# Patient Record
Sex: FEMALE | Race: WHITE | NOT HISPANIC OR LATINO | Employment: FULL TIME | URBAN - METROPOLITAN AREA
[De-identification: names, ages, dates, MRNs, and addresses within clinical notes are randomized per-mention and may not be internally consistent; named-entity substitution may affect disease eponyms.]

---

## 2022-07-15 ENCOUNTER — OFFICE VISIT (OUTPATIENT)
Dept: FAMILY MEDICINE CLINIC | Facility: CLINIC | Age: 57
End: 2022-07-15

## 2022-07-15 ENCOUNTER — PATIENT ROUNDING (BHMG ONLY) (OUTPATIENT)
Dept: FAMILY MEDICINE CLINIC | Facility: CLINIC | Age: 57
End: 2022-07-15

## 2022-07-15 VITALS
OXYGEN SATURATION: 98 % | BODY MASS INDEX: 26.43 KG/M2 | SYSTOLIC BLOOD PRESSURE: 118 MMHG | TEMPERATURE: 96.9 F | DIASTOLIC BLOOD PRESSURE: 77 MMHG | HEART RATE: 85 BPM | RESPIRATION RATE: 16 BRPM | WEIGHT: 143.6 LBS | HEIGHT: 62 IN

## 2022-07-15 DIAGNOSIS — Z13.220 SCREENING FOR CHOLESTEROL LEVEL: ICD-10-CM

## 2022-07-15 DIAGNOSIS — G89.29 CHRONIC RIGHT SHOULDER PAIN: ICD-10-CM

## 2022-07-15 DIAGNOSIS — M25.511 CHRONIC RIGHT SHOULDER PAIN: ICD-10-CM

## 2022-07-15 DIAGNOSIS — Z12.11 COLON CANCER SCREENING: ICD-10-CM

## 2022-07-15 DIAGNOSIS — J44.9 CHRONIC OBSTRUCTIVE PULMONARY DISEASE, UNSPECIFIED COPD TYPE: Primary | ICD-10-CM

## 2022-07-15 DIAGNOSIS — Z13.1 SCREENING FOR DIABETES MELLITUS: ICD-10-CM

## 2022-07-15 PROCEDURE — 99204 OFFICE O/P NEW MOD 45 MIN: CPT | Performed by: FAMILY MEDICINE

## 2022-07-15 RX ORDER — MELOXICAM 7.5 MG/1
7.5 TABLET ORAL 2 TIMES DAILY
COMMUNITY

## 2022-07-15 NOTE — PROGRESS NOTES
Subjective   Yolanda Burk is a 56 y.o. female.   Chief Complaint   Patient presents with   • Establish Care   • COPD   • Shoulder Pain       History of Present Illness   Presents to the office today as a new patient.  She saw Dr. Camacho in [place] for 30 years.  She retired recently.  Past medical history updated as below.  No current complaints.  She is in her usual state.  Please see below for additional discussion regarding chronic problems and preventive care.        Patient Active Problem List    Diagnosis Date Noted   • Chronic right shoulder pain 07/15/2022     Note Last Updated: 7/15/2022     Chronic bursitis     • Chronic obstructive lung disease (HCC) 2016     Note Last Updated: 7/15/2022     Had PFTs - at [place] - 2016    Was on Trelegy - got expensive.    Does OK with Anoro. Has cough that she didn't have on Trelegy             Past Surgical History:   Procedure Laterality Date   •  SECTION       Current Outpatient Medications on File Prior to Visit   Medication Sig   • meloxicam (MOBIC) 7.5 MG tablet Take 7.5 mg by mouth 2 (Two) Times a Day.   • umeclidinium-vilanterol (Anoro Ellipta) 62.5-25 MCG/INH aerosol powder  inhaler Inhale 1 puff Daily.     No current facility-administered medications on file prior to visit.     No Known Allergies  Social History     Socioeconomic History   • Marital status:    Tobacco Use   • Smoking status: Current Every Day Smoker     Packs/day: 1.00     Years: 40.00     Pack years: 40.00     Types: Cigarettes     Start date:    • Smokeless tobacco: Never Used   • Tobacco comment: she is ready to quit but also in not ready   Vaping Use   • Vaping Use: Never used   Substance and Sexual Activity   • Alcohol use: Not Currently     Comment: rarely   • Drug use: Never   • Sexual activity: Yes     Partners: Male     Family History   Problem Relation Age of Onset   • No Known Problems Mother    • Tuberculosis Father    • Lung disease Father    •  "Hypertension Sister    • Hypertension Brother        Review of Systems    Objective   /77 (BP Location: Left arm, Patient Position: Sitting, Cuff Size: Adult)   Pulse 85   Temp 96.9 °F (36.1 °C) (Infrared)   Resp 16   Ht 157.5 cm (62\")   Wt 65.1 kg (143 lb 9.6 oz)   LMP  (LMP Unknown)   SpO2 98%   Breastfeeding No   BMI 26.26 kg/m²   Physical Exam  Constitutional:       General: She is not in acute distress.     Appearance: She is well-developed.      Comments: Wearing a face mask     HENT:      Head: Normocephalic and atraumatic.   Eyes:      Conjunctiva/sclera: Conjunctivae normal.   Cardiovascular:      Rate and Rhythm: Normal rate and regular rhythm.      Heart sounds: Normal heart sounds. No murmur heard.  Pulmonary:      Effort: Accessory muscle usage and prolonged expiration present. No respiratory distress.      Breath sounds: Decreased breath sounds present. No wheezing, rhonchi or rales.      Comments: No wheezing even on forced expiration.  Musculoskeletal:         General: Normal range of motion.      Cervical back: Normal range of motion.   Skin:     General: Skin is warm and dry.      Findings: No rash.   Neurological:      Mental Status: She is alert and oriented to person, place, and time.   Psychiatric:         Behavior: Behavior normal.         Assessment & Plan   Diagnoses and all orders for this visit:    1. Chronic obstructive pulmonary disease, unspecified COPD type (HCC) (Primary)  -     CBC & Differential    2. Chronic right shoulder pain    3. Colon cancer screening  -     Cologuard - Stool, Per Rectum; Future    4. Screening for cholesterol level  -     Lipid Panel    5. Screening for diabetes mellitus  -     Comprehensive Metabolic Panel    .  Patient new patient to the practice here transferring has her previous doctor of 30 years retired.  Currently in her chronic stable state and doing well.  Recommend continuing meloxicam and Anoro as prescribed.  Regarding preventive " care she has never had a colon cancer screening.  She agrees to Cologuard, which I think is a fine option for her.  She will be due for a mammogram in December 2022 or early 23.  She is not sure when her last Pap smear was but thinks it was over 3 years ago. (Review of previous PCP records indicates last Pap I can find was July 2017-it was normal.)  We will look into her old records and see if we can find information about that.  We will do labs today to screen her for diabetes, high cholesterol and anemia.  We will follow-up with her twice a year to keep an eye on her pulmonary status.  She has had a pneumococcal vaccine and she is vaccinated against COVID-19.  We will also discuss with her at the next visit possible lung cancer screening CT of the chest as she does not believe she has ever had one.    Call with any problems or concerns before next visit       Return in about 6 months (around 1/15/2023).      Much of this report is an electronic transcription of spoken language to printed text using Dragon dictation software.  As such, the subtleties and finesse of spoken language may permit erroneous, or at times, nonsensical words or phrases to be inadvertently transcribed; thus changes may be made at a later date to rectify these errors.     Rosalind Dutton MD7/16/202209:21 EDT  This note has been electronically signed

## 2022-07-15 NOTE — PROGRESS NOTES
July 15, 2022    Hello, may I speak with Yolanda Burk?    My name is Nyasia Sanford     I am  with [facility]  [address]  [place] [address].    Before we get started may I verify your date of birth? 1965    I am calling to officially welcome you to our practice and ask about your recent visit. Is this a good time to talk? yes    Tell me about your visit with us. What things went well?  It went well.  I was so nervous about having a new provider.  I was with my last one for 30 years, they retired.  Everyone is so nice and I won't be nervous next time.       We're always looking for ways to make our patients' experiences even better. Do you have recommendations on ways we may improve?  no    Overall were you satisfied with your first visit to our practice? yes       I appreciate you taking the time to speak with me today. Is there anything else I can do for you? no      Thank you, and have a great day.

## 2022-07-16 LAB
ALBUMIN SERPL-MCNC: 4.5 G/DL (ref 3.8–4.9)
ALBUMIN/GLOB SERPL: 1.9 {RATIO} (ref 1.2–2.2)
ALP SERPL-CCNC: 99 IU/L (ref 44–121)
ALT SERPL-CCNC: 10 IU/L (ref 0–32)
AST SERPL-CCNC: 17 IU/L (ref 0–40)
BASOPHILS # BLD AUTO: 0.1 X10E3/UL (ref 0–0.2)
BASOPHILS NFR BLD AUTO: 1 %
BILIRUB SERPL-MCNC: <0.2 MG/DL (ref 0–1.2)
BUN SERPL-MCNC: 18 MG/DL (ref 6–24)
BUN/CREAT SERPL: 20 (ref 9–23)
CALCIUM SERPL-MCNC: 9.8 MG/DL (ref 8.7–10.2)
CHLORIDE SERPL-SCNC: 106 MMOL/L (ref 96–106)
CHOLEST SERPL-MCNC: 212 MG/DL (ref 100–199)
CO2 SERPL-SCNC: 24 MMOL/L (ref 20–29)
CREAT SERPL-MCNC: 0.88 MG/DL (ref 0.57–1)
EGFRCR SERPLBLD CKD-EPI 2021: 78 ML/MIN/1.73
EOSINOPHIL # BLD AUTO: 0.2 X10E3/UL (ref 0–0.4)
EOSINOPHIL NFR BLD AUTO: 2 %
ERYTHROCYTE [DISTWIDTH] IN BLOOD BY AUTOMATED COUNT: 12.4 % (ref 11.7–15.4)
GLOBULIN SER CALC-MCNC: 2.4 G/DL (ref 1.5–4.5)
GLUCOSE SERPL-MCNC: 91 MG/DL (ref 65–99)
HCT VFR BLD AUTO: 41.5 % (ref 34–46.6)
HDLC SERPL-MCNC: 67 MG/DL
HGB BLD-MCNC: 14.1 G/DL (ref 11.1–15.9)
IMM GRANULOCYTES # BLD AUTO: 0 X10E3/UL (ref 0–0.1)
IMM GRANULOCYTES NFR BLD AUTO: 0 %
LDLC SERPL CALC-MCNC: 127 MG/DL (ref 0–99)
LYMPHOCYTES # BLD AUTO: 3.6 X10E3/UL (ref 0.7–3.1)
LYMPHOCYTES NFR BLD AUTO: 45 %
MCH RBC QN AUTO: 31.1 PG (ref 26.6–33)
MCHC RBC AUTO-ENTMCNC: 34 G/DL (ref 31.5–35.7)
MCV RBC AUTO: 92 FL (ref 79–97)
MONOCYTES # BLD AUTO: 0.7 X10E3/UL (ref 0.1–0.9)
MONOCYTES NFR BLD AUTO: 8 %
NEUTROPHILS # BLD AUTO: 3.5 X10E3/UL (ref 1.4–7)
NEUTROPHILS NFR BLD AUTO: 44 %
PLATELET # BLD AUTO: 341 X10E3/UL (ref 150–450)
POTASSIUM SERPL-SCNC: 4.4 MMOL/L (ref 3.5–5.2)
PROT SERPL-MCNC: 6.9 G/DL (ref 6–8.5)
RBC # BLD AUTO: 4.53 X10E6/UL (ref 3.77–5.28)
SODIUM SERPL-SCNC: 145 MMOL/L (ref 134–144)
TRIGL SERPL-MCNC: 102 MG/DL (ref 0–149)
VLDLC SERPL CALC-MCNC: 18 MG/DL (ref 5–40)
WBC # BLD AUTO: 8 X10E3/UL (ref 3.4–10.8)

## 2022-11-11 ENCOUNTER — TELEPHONE (OUTPATIENT)
Dept: FAMILY MEDICINE CLINIC | Facility: CLINIC | Age: 57
End: 2022-11-11

## 2022-11-11 DIAGNOSIS — J44.9 CHRONIC OBSTRUCTIVE PULMONARY DISEASE, UNSPECIFIED COPD TYPE: Primary | ICD-10-CM

## 2022-11-11 RX ORDER — FLUTICASONE FUROATE, UMECLIDINIUM BROMIDE AND VILANTEROL TRIFENATATE 100; 62.5; 25 UG/1; UG/1; UG/1
1 POWDER RESPIRATORY (INHALATION)
Qty: 1 EACH | Refills: 5 | Status: SHIPPED | OUTPATIENT
Start: 2022-11-11

## 2022-11-11 NOTE — TELEPHONE ENCOUNTER
Please let Yolanda know that I have sent in a prescription for the Trelegy.  Just to be clear, it will replace the Anoro she had been on.  Thanks

## 2022-11-11 NOTE — TELEPHONE ENCOUNTER
Caller: Yolanda Burk    Relationship: Self    Best call back number: [phone]     What medication are you requesting: TRELEGY ELLIPTA    What are your current symptoms: COPD    How long have you been experiencing symptoms: 7 YEARS    Have you had these symptoms before:    [x] Yes  [] No    Have you been treated for these symptoms before:   [x] Yes  [] No    If a prescription is needed, what is your preferred pharmacy and phone number: [facility] [id number] - [place], [place] - [address] [phone] [facility] [phone]

## 2023-01-03 ENCOUNTER — OFFICE VISIT (OUTPATIENT)
Dept: FAMILY MEDICINE CLINIC | Facility: CLINIC | Age: 58
End: 2023-01-03
Payer: COMMERCIAL

## 2023-01-03 VITALS
HEART RATE: 102 BPM | WEIGHT: 143.6 LBS | HEIGHT: 62 IN | RESPIRATION RATE: 16 BRPM | OXYGEN SATURATION: 99 % | BODY MASS INDEX: 26.43 KG/M2 | SYSTOLIC BLOOD PRESSURE: 130 MMHG | TEMPERATURE: 97.8 F | DIASTOLIC BLOOD PRESSURE: 80 MMHG

## 2023-01-03 DIAGNOSIS — J06.9 UPPER RESPIRATORY TRACT INFECTION, UNSPECIFIED TYPE: ICD-10-CM

## 2023-01-03 DIAGNOSIS — R05.1 ACUTE COUGH: ICD-10-CM

## 2023-01-03 DIAGNOSIS — J02.9 SORE THROAT: Primary | ICD-10-CM

## 2023-01-03 LAB
EXPIRATION DATE: NORMAL
FLUAV AG UPPER RESP QL IA.RAPID: NOT DETECTED
FLUBV AG UPPER RESP QL IA.RAPID: NOT DETECTED
INTERNAL CONTROL: NORMAL
Lab: NORMAL
SARS-COV-2 AG UPPER RESP QL IA.RAPID: NOT DETECTED

## 2023-01-03 PROCEDURE — 87428 SARSCOV & INF VIR A&B AG IA: CPT | Performed by: FAMILY MEDICINE

## 2023-01-03 PROCEDURE — 99214 OFFICE O/P EST MOD 30 MIN: CPT | Performed by: FAMILY MEDICINE

## 2023-01-03 RX ORDER — LEVOFLOXACIN 500 MG/1
500 TABLET, FILM COATED ORAL DAILY
Qty: 7 TABLET | Refills: 0 | Status: SHIPPED | OUTPATIENT
Start: 2023-01-03 | End: 2023-01-16

## 2023-01-03 RX ORDER — PREDNISONE 20 MG/1
TABLET ORAL
Qty: 19 TABLET | Refills: 0 | Status: SHIPPED | OUTPATIENT
Start: 2023-01-03 | End: 2023-01-16

## 2023-01-03 NOTE — PROGRESS NOTES
Subjective   Yolanda Burk is a 57 y.o. female.   Chief Complaint   Patient presents with   • Sore Throat   • Cough       History of Present Illness   Presents to the office today as a same-day add-on for what schedule says is sore throat, cough.  She reports the symptoms onset about 5 days ago.  2 days ago she had a temperature of 100.1 degrees.  That responded to Tylenol.  Symptoms have included sore throat, occasional chills.  Deep breath provokes a cough, her ears feel stopped up.  She is still able to eat and drink.  She gets a little shortness of breath with exertion.      Patient Active Problem List    Diagnosis Date Noted   • Chronic right shoulder pain 07/15/2022     Note Last Updated: 7/15/2022     Chronic bursitis     • Chronic obstructive lung disease (HCC) 2016     Note Last Updated: 2022     Had PFTs - at [place] - 2016.  Diagnosis was severe obstructive defect-reversible.  Alpha-1 antitrypsin testing was negative.  Chest x-ray around 16 or 17 had changes consistent with COPD.    Was on Trelegy - got expensive.    Does OK with Anoro. Has cough that she didn't have on Trelegy             Past Surgical History:   Procedure Laterality Date   •  SECTION       Current Outpatient Medications on File Prior to Visit   Medication Sig   • Fluticasone-Umeclidin-Vilant (Trelegy Ellipta) 100-62.5-25 MCG/ACT inhaler Inhale 1 puff Daily.   • meloxicam (MOBIC) 7.5 MG tablet Take 7.5 mg by mouth 2 (Two) Times a Day.     No current facility-administered medications on file prior to visit.     No Known Allergies  Social History     Socioeconomic History   • Marital status:    Tobacco Use   • Smoking status: Every Day     Packs/day: 1.00     Years: 40.00     Pack years: 40.00     Types: Cigarettes     Start date:      Passive exposure: Current   • Smokeless tobacco: Never   • Tobacco comments:     she is ready to quit but also in not ready   Vaping Use   • Vaping Use: Never used    Substance and Sexual Activity   • Alcohol use: Not Currently     Comment: rarely   • Drug use: Never   • Sexual activity: Yes     Partners: Male     Family History   Problem Relation Age of Onset   • No Known Problems Mother    • Tuberculosis Father    • Lung disease Father    • Hypertension Sister    • Hypertension Brother        Review of Systems    Objective   /80 (BP Location: Left arm, Patient Position: Sitting, Cuff Size: Adult)   Pulse 102   Temp 97.8 °F (36.6 °C) (Infrared)   Resp 16   Ht 157.5 cm (62\")   Wt 65.1 kg (143 lb 9.6 oz)   LMP  (LMP Unknown)   SpO2 99%   Breastfeeding No   BMI 26.26 kg/m²   Physical Exam  Constitutional:       Appearance: She is well-developed.      Comments: Wearing a face mask     HENT:      Head: Normocephalic and atraumatic.      Ears:      Comments: Both TMs are dull, bulging and show small air-fluid levels.     Nose: No congestion.      Mouth/Throat:      Pharynx: Posterior oropharyngeal erythema present. No oropharyngeal exudate.   Eyes:      Conjunctiva/sclera: Conjunctivae normal.   Cardiovascular:      Rate and Rhythm: Normal rate.   Pulmonary:      Effort: Pulmonary effort is normal.      Comments: Forced expiration provokes a paroxysmal of cough.  She has some very loose scattered rhonchi that do clear after the cough  Musculoskeletal:         General: Normal range of motion.      Cervical back: Normal range of motion.      Right lower leg: No edema.      Left lower leg: No edema.   Lymphadenopathy:      Cervical: No cervical adenopathy.   Skin:     General: Skin is warm and dry.      Findings: No rash.   Neurological:      Mental Status: She is alert and oriented to person, place, and time.   Psychiatric:         Behavior: Behavior normal.           Office Visit on 01/03/2023   Component Date Value Ref Range Status   • SARS Antigen 01/03/2023 Not Detected  Not Detected, Presumptive Negative Final   • Influenza A Antigen BETO 01/03/2023 Not Detected   Not Detected Final   • Influenza B Antigen BETO 01/03/2023 Not Detected  Not Detected Final   • Internal Control 01/03/2023 Passed  Passed Final   • Lot Number 01/03/2023 [id number]   Final   • Expiration Date 01/03/2023 [id number]   Final           Assessment & Plan   Diagnoses and all orders for this visit:    1. Sore throat (Primary)  -     POCT SARS-CoV-2 Antigen BETO + Flu    2. Acute cough  -     POCT SARS-CoV-2 Antigen BETO + Flu    3. Upper respiratory tract infection, unspecified type  -     predniSONE (DELTASONE) 20 MG tablet; 3 per day x 3 days. 2 per day x 3 days, 1 per day x 2 days, then 1/2 per day x 4 day  Dispense: 19 tablet; Refill: 0  -     levoFLOXacin (Levaquin) 500 MG tablet; Take 1 tablet by mouth Daily.  Dispense: 7 tablet; Refill: 0    Overall, I think she has had an upper respiratory infection which is probably turning into a draining sinus infection.  This would explain her cough, ears being stopped up, sore throat.  In office COVID and flu test was negative.  We will treat her for draining sinus infection in early bronchitis with prednisone and Levaquin as above.  Of also recommended Mucinex twice daily with full glass of water and over-the-counter Delsym for the cough.  Keep follow-up as scheduled on January 16 and we can also reevaluate this condition at that time.        Call with any problems or concerns before next visit       Return if symptoms worsen or fail to improve.      Much of this report is an electronic transcription of spoken language to printed text using Dragon dictation software.  As such, the subtleties and finesse of spoken language may permit erroneous, or at times, nonsensical words or phrases to be inadvertently transcribed; thus changes may be made at a later date to rectify these errors.     Rosalind Dutton MD1/3/206253:55 EST  This note has been electronically signed

## 2023-01-16 ENCOUNTER — OFFICE VISIT (OUTPATIENT)
Dept: FAMILY MEDICINE CLINIC | Facility: CLINIC | Age: 58
End: 2023-01-16
Payer: COMMERCIAL

## 2023-01-16 VITALS
HEIGHT: 62 IN | BODY MASS INDEX: 26.28 KG/M2 | TEMPERATURE: 97.1 F | SYSTOLIC BLOOD PRESSURE: 100 MMHG | OXYGEN SATURATION: 100 % | DIASTOLIC BLOOD PRESSURE: 70 MMHG | RESPIRATION RATE: 16 BRPM | WEIGHT: 142.8 LBS | HEART RATE: 61 BPM

## 2023-01-16 DIAGNOSIS — Z12.31 ENCOUNTER FOR SCREENING MAMMOGRAM FOR MALIGNANT NEOPLASM OF BREAST: ICD-10-CM

## 2023-01-16 DIAGNOSIS — Z87.891 PERSONAL HISTORY OF NICOTINE DEPENDENCE: ICD-10-CM

## 2023-01-16 DIAGNOSIS — J44.9 CHRONIC OBSTRUCTIVE PULMONARY DISEASE, UNSPECIFIED COPD TYPE: Primary | ICD-10-CM

## 2023-01-16 DIAGNOSIS — Z12.11 COLON CANCER SCREENING: ICD-10-CM

## 2023-01-16 PROCEDURE — 99214 OFFICE O/P EST MOD 30 MIN: CPT | Performed by: FAMILY MEDICINE

## 2023-01-16 NOTE — PROGRESS NOTES
Subjective   Yolanda Burk is a 57 y.o. female.   Chief Complaint   Patient presents with   • COPD       History of Present Illness   Presents to the office today to follow-up on COPD.  Transferred care here 6 months ago.  I saw her 2 weeks ago for probable early bronchitis.  Treated with Levaquin and prednisone.  Feels a lot better now.  Flu and COVID test were negative.    We did labs when I saw her 6 months ago.  She is due for a mammogram.  She has never had anything to screen her for lung cancer.    As above, she is nearly back to her chronic stable state.  Does not have any shortness of breath with exertion.  No current cough.  No specific complaints today.    Tried to do Cologuard, but received an email from them that they could not process the specimen.      Patient Active Problem List    Diagnosis Date Noted   • Chronic right shoulder pain 07/15/2022     Note Last Updated: 7/15/2022     Chronic bursitis     • Chronic obstructive lung disease (HCC) 2016     Note Last Updated: 2022     Had PFTs - at [place] - 2016.  Diagnosis was severe obstructive defect-reversible.  Alpha-1 antitrypsin testing was negative.  Chest x-ray around 16 or 17 had changes consistent with COPD.    Was on Trelegy - got expensive.    Does OK with Anoro. Has cough that she didn't have on Trelegy             Past Surgical History:   Procedure Laterality Date   •  SECTION       Current Outpatient Medications on File Prior to Visit   Medication Sig   • Fluticasone-Umeclidin-Vilant (Trelegy Ellipta) 100-62.5-25 MCG/ACT inhaler Inhale 1 puff Daily.   • meloxicam (MOBIC) 7.5 MG tablet Take 7.5 mg by mouth 2 (Two) Times a Day.   • [DISCONTINUED] levoFLOXacin (Levaquin) 500 MG tablet Take 1 tablet by mouth Daily.   • [DISCONTINUED] predniSONE (DELTASONE) 20 MG tablet 3 per day x 3 days. 2 per day x 3 days, 1 per day x 2 days, then 1/2 per day x 4 day     No current facility-administered medications on file prior to  "visit.     No Known Allergies  Social History     Socioeconomic History   • Marital status:    Tobacco Use   • Smoking status: Every Day     Packs/day: 1.00     Years: 40.00     Pack years: 40.00     Types: Cigarettes     Start date: 1982     Passive exposure: Current   • Smokeless tobacco: Never   • Tobacco comments:     she is ready to quit but also in not ready   Vaping Use   • Vaping Use: Never used   Substance and Sexual Activity   • Alcohol use: Not Currently     Comment: rarely   • Drug use: Never   • Sexual activity: Yes     Partners: Male     Family History   Problem Relation Age of Onset   • No Known Problems Mother    • Tuberculosis Father    • Lung disease Father    • Hypertension Sister    • Hypertension Brother        Review of Systems    Objective   /70 (BP Location: Left arm, Patient Position: Sitting, Cuff Size: Adult)   Pulse 61   Temp 97.1 °F (36.2 °C) (Infrared)   Resp 16   Ht 157.5 cm (62\")   Wt 64.8 kg (142 lb 12.8 oz)   LMP  (LMP Unknown)   SpO2 100%   Breastfeeding No   BMI 26.12 kg/m²   Physical Exam  Constitutional:       General: She is not in acute distress.     Appearance: She is well-developed.      Comments: Wearing a face mask     HENT:      Head: Normocephalic and atraumatic.   Eyes:      Conjunctiva/sclera: Conjunctivae normal.   Cardiovascular:      Rate and Rhythm: Normal rate and regular rhythm.      Heart sounds: Normal heart sounds. No murmur heard.  Pulmonary:      Effort: Pulmonary effort is normal. No respiratory distress.      Breath sounds: Normal breath sounds.      Comments: Lungs actually sound amazingly good.  Just a little bit of use of accessory muscles of respiration.  Musculoskeletal:         General: Normal range of motion.      Cervical back: Normal range of motion.      Right lower leg: No edema.      Left lower leg: No edema.   Skin:     General: Skin is warm and dry.      Findings: No rash.   Neurological:      Mental Status: She is alert " and oriented to person, place, and time.      Gait: Gait normal.   Psychiatric:         Mood and Affect: Mood normal.         Behavior: Behavior normal.           Office Visit on 01/03/2023   Component Date Value Ref Range Status   • SARS Antigen 01/03/2023 Not Detected  Not Detected, Presumptive Negative Final   • Influenza A Antigen BETO 01/03/2023 Not Detected  Not Detected Final   • Influenza B Antigen BETO 01/03/2023 Not Detected  Not Detected Final   • Internal Control 01/03/2023 Passed  Passed Final   • Lot Number 01/03/2023 [id number]   Final   • Expiration Date 01/03/2023 [id number]   Final         Assessment & Plan   Diagnoses and all orders for this visit:    1. Chronic obstructive pulmonary disease, unspecified COPD type (HCC) (Primary)    2. Encounter for screening mammogram for malignant neoplasm of breast  -     Mammo Screening Digital Tomosynthesis Bilateral With CAD; Future    3. Colon cancer screening  -     Ambulatory Referral to Gastroenterology    4. Personal history of nicotine dependence  -     CT Chest Low Dose Wo; Future    Overall, Yolanda is in her chronic stable state.  She has recovered from the recent COPD exacerbation.  She is at her baseline.  She is due for a mammogram.  We will get that scheduled for her [facility] at [facility].  She is agreeable to doing a CT scan of her chest to screen for lung cancer.  We are going to make referral to [facility] for a colonoscopy.  I discussed screening colonoscopy with her.  Finally, in reviewing her records she had a Pap smear back in 2017 that was normal.  I would like to see her back at her convenience for a Pap smear.  We will see her again in 6 months for management of her chronic medical problems unless she develops problems before then and needs to come in sooner.        Call with any problems or concerns before next visit       Return in about 6 months (around 7/16/2023), or PLUS at her convenience for a PAP.      Much of this report is  an electronic transcription of spoken language to printed text using Dragon dictation software.  As such, the subtleties and finesse of spoken language may permit erroneous, or at times, nonsensical words or phrases to be inadvertently transcribed; thus changes may be made at a later date to rectify these errors.     Rosalind Dutton MD1/16/202309:02 EST  This note has been electronically signed

## 2023-02-02 DIAGNOSIS — Z12.31 ENCOUNTER FOR SCREENING MAMMOGRAM FOR MALIGNANT NEOPLASM OF BREAST: ICD-10-CM

## 2023-02-03 DIAGNOSIS — Z87.891 PERSONAL HISTORY OF NICOTINE DEPENDENCE: ICD-10-CM

## 2023-05-16 ENCOUNTER — TELEPHONE (OUTPATIENT)
Dept: FAMILY MEDICINE CLINIC | Facility: CLINIC | Age: 58
End: 2023-05-16
Payer: COMMERCIAL

## 2023-05-16 DIAGNOSIS — J44.9 CHRONIC OBSTRUCTIVE PULMONARY DISEASE, UNSPECIFIED COPD TYPE: ICD-10-CM

## 2023-05-16 RX ORDER — MELOXICAM 7.5 MG/1
7.5 TABLET ORAL 2 TIMES DAILY
Qty: 180 TABLET | Refills: 3 | Status: SHIPPED | OUTPATIENT
Start: 2023-05-16

## 2023-05-16 RX ORDER — FLUTICASONE FUROATE, UMECLIDINIUM BROMIDE AND VILANTEROL TRIFENATATE 100; 62.5; 25 UG/1; UG/1; UG/1
1 POWDER RESPIRATORY (INHALATION) DAILY
Qty: 180 EACH | Refills: 3 | Status: SHIPPED | OUTPATIENT
Start: 2023-05-16

## 2023-05-16 RX ORDER — FLUTICASONE FUROATE, UMECLIDINIUM BROMIDE AND VILANTEROL TRIFENATATE 100; 62.5; 25 UG/1; UG/1; UG/1
POWDER RESPIRATORY (INHALATION)
Qty: 180 EACH | Refills: 1 | Status: SHIPPED | OUTPATIENT
Start: 2023-05-16 | End: 2023-05-16 | Stop reason: SDUPTHER

## 2023-05-16 NOTE — TELEPHONE ENCOUNTER
Caller: Yolanda Burk    Relationship: Self    Best call back number:[phone] (    Requested Prescriptions:    Trelegy Ellipta 100-62.5-25 MCG/ACT inhaler      meloxicam (MOBIC) 7.5 MG tablet       Pharmacy where request should be sent:    [id number] [address]  - [phone] [facility] [phone]   [phone]  Last office visit with prescribing clinician: 1/16/2023   Last telemedicine visit with prescribing clinician: 5/16/2023   Next office visit with prescribing clinician: 7/17/2023     Additional details provided by patient: PATIENT IS CALLING TO REQUEST A NEW 90 DAY PRESCRIPTION WITH REFILLS FOR THE ABOVE MEDICATIONS.  Does the patient have less than a 3 day supply:  [x] Yes  [] No    Would you like a call back once the refill request has been completed: [x] Yes [] No    If the office needs to give you a call back, can they leave a voicemail: [x] Yes [] No    Nava Cervantes Rep   05/16/23 15:17 EDT    PLEASE ADVISE.

## 2023-07-17 PROBLEM — D12.6 ADENOMATOUS POLYP OF COLON: Status: ACTIVE | Noted: 2023-07-17

## 2024-01-16 ENCOUNTER — OFFICE VISIT (OUTPATIENT)
Dept: FAMILY MEDICINE CLINIC | Facility: CLINIC | Age: 59
End: 2024-01-16
Payer: COMMERCIAL

## 2024-01-16 VITALS
OXYGEN SATURATION: 93 % | HEART RATE: 108 BPM | TEMPERATURE: 97.3 F | DIASTOLIC BLOOD PRESSURE: 92 MMHG | BODY MASS INDEX: 28.52 KG/M2 | SYSTOLIC BLOOD PRESSURE: 159 MMHG | HEIGHT: 62 IN | WEIGHT: 155 LBS | RESPIRATION RATE: 18 BRPM

## 2024-01-16 DIAGNOSIS — Z87.891 PERSONAL HISTORY OF NICOTINE DEPENDENCE: ICD-10-CM

## 2024-01-16 DIAGNOSIS — J44.9 CHRONIC OBSTRUCTIVE PULMONARY DISEASE, UNSPECIFIED COPD TYPE: ICD-10-CM

## 2024-01-16 DIAGNOSIS — Z12.4 PAP SMEAR FOR CERVICAL CANCER SCREENING: ICD-10-CM

## 2024-01-16 DIAGNOSIS — Z12.31 ENCOUNTER FOR SCREENING MAMMOGRAM FOR MALIGNANT NEOPLASM OF BREAST: ICD-10-CM

## 2024-01-16 DIAGNOSIS — Z00.00 PHYSICAL EXAM, ANNUAL: Primary | ICD-10-CM

## 2024-01-16 PROCEDURE — 99396 PREV VISIT EST AGE 40-64: CPT | Performed by: FAMILY MEDICINE

## 2024-01-16 NOTE — PROGRESS NOTES
Subjective   Yolanda Burk is a 58 y.o. female.   Chief Complaint   Patient presents with    Gynecologic Exam    COPD       History of Present Illness   Healthy 58-year-old white female presents the office today for annual preventive health exam.  She is due for mammogram and CT chest to screen for lung cancer after  of this year.  She is due for a Pap smear today.  She has no specific complaints.  No breast complaints such as tenderness, drainage, lumps or masses.  No vaginal discharge or belly pain.    She does have a history of COPD.  Per pulmonary function test in 2016 this was categorized as severe.  She is on Trelegy and has an albuterol inhaler to use as needed.  She is still smoking, but has cut way down.        Patient Active Problem List    Diagnosis Date Noted    Adenomatous polyp of colon 2023     Note Last Updated: 2023     Colonoscopy-2023.  Tubular adenomas in the ascending, transverse, descending:      Chronic right shoulder pain 07/15/2022     Note Last Updated: 7/15/2022     Chronic bursitis      Chronic obstructive lung disease 2016     Note Last Updated: 2022     Had PFTs - at [facility] - .  Diagnosis was severe obstructive defect-reversible.  Alpha-1 antitrypsin testing was negative.  Chest x-ray around 16 or 17 had changes consistent with COPD.    Was on Trelegy - got expensive.    Does OK with Anoro. Has cough that she didn't have on Trelegy             Past Surgical History:   Procedure Laterality Date     SECTION      COLONOSCOPY W/ BIOPSIES  2023    Dr. Becerra -     Current Outpatient Medications on File Prior to Visit   Medication Sig    albuterol sulfate  (90 Base) MCG/ACT inhaler Inhale 2 puffs Every 4 (Four) Hours As Needed for Wheezing.    Fluticasone-Umeclidin-Vilant (Trelegy Ellipta) 100-62.5-25 MCG/ACT inhaler Inhale 1 puff Daily.    meloxicam (MOBIC) 7.5 MG tablet Take 1 tablet by mouth 2 (Two) Times a Day.     No  "current facility-administered medications on file prior to visit.     No Known Allergies  Social History     Socioeconomic History    Marital status:    Tobacco Use    Smoking status: Every Day     Packs/day: 0.50     Years: 40.00     Additional pack years: 0.00     Total pack years: 20.00     Types: Cigarettes     Start date: 1982     Passive exposure: Current    Smokeless tobacco: Never    Tobacco comments:     she is ready to quit but also in not ready   Vaping Use    Vaping Use: Never used   Substance and Sexual Activity    Alcohol use: Not Currently     Comment: rarely    Drug use: Never    Sexual activity: Yes     Partners: Male     Family History   Problem Relation Age of Onset    No Known Problems Mother     Tuberculosis Father     Lung disease Father     Hypertension Sister     Hypertension Brother        Review of Systems    Objective   /92 (BP Location: Right arm, Patient Position: Sitting, Cuff Size: Adult)   Pulse 108   Temp 97.3 °F (36.3 °C) (Infrared)   Resp 18   Ht 157.5 cm (62\")   Wt 70.3 kg (155 lb)   LMP  (LMP Unknown)   SpO2 93%   Breastfeeding No   BMI 28.35 kg/m²   Physical Exam  Exam conducted with a chaperone present.   Constitutional:       General: She is not in acute distress.     Appearance: She is well-developed.      Comments:      HENT:      Head: Normocephalic and atraumatic.   Eyes:      Conjunctiva/sclera: Conjunctivae normal.   Cardiovascular:      Rate and Rhythm: Normal rate and regular rhythm.      Heart sounds: No murmur heard.  Pulmonary:      Effort: Pulmonary effort is normal. Accessory muscle usage and prolonged expiration present. No respiratory distress.      Breath sounds: Decreased breath sounds present. No wheezing, rhonchi or rales.   Chest:   Breasts:     Dragan Score is 5.      Breasts are symmetrical.      Right: Normal.      Left: Normal.   Abdominal:      General: There is no distension.      Palpations: There is no mass.      Tenderness: " There is no abdominal tenderness. There is no guarding.      Hernia: There is no hernia in the left inguinal area or right inguinal area.   Genitourinary:     Exam position: Lithotomy position.      Dragan stage (genital): 5.      Labia:         Right: No rash, tenderness, lesion or injury.         Left: No rash, tenderness, lesion or injury.       Urethra: No prolapse.      Vagina: Normal.      Cervix: Normal and dilated. No friability.      Uterus: Normal.       Adnexa: Right adnexa normal and left adnexa normal.        Right: No mass or tenderness.          Left: No mass or tenderness.     Musculoskeletal:         General: Normal range of motion.      Cervical back: Normal range of motion.   Lymphadenopathy:      Upper Body:      Right upper body: No supraclavicular, axillary or pectoral adenopathy.      Left upper body: No supraclavicular, axillary or pectoral adenopathy.   Skin:     General: Skin is warm and dry.      Findings: No rash.   Neurological:      Mental Status: She is alert and oriented to person, place, and time.   Psychiatric:         Behavior: Behavior normal.           No visits with results within 4 Month(s) from this visit.   Latest known visit with results is:   Office Visit on 07/17/2023   Component Date Value Ref Range Status    Glucose 07/17/2023 83  70 - 99 mg/dL Final    BUN 07/17/2023 13  6 - 24 mg/dL Final    Creatinine 07/17/2023 0.81  0.57 - 1.00 mg/dL Final    EGFR Result 07/17/2023 85  >59 mL/min/1.73 Final    BUN/Creatinine Ratio 07/17/2023 16  9 - 23 Final    Sodium 07/17/2023 139  134 - 144 mmol/L Final    Potassium 07/17/2023 5.4 (H)  3.5 - 5.2 mmol/L Final    Chloride 07/17/2023 102  96 - 106 mmol/L Final    Total CO2 07/17/2023 23  20 - 29 mmol/L Final    Calcium 07/17/2023 10.4 (H)  8.7 - 10.2 mg/dL Final    Total Protein 07/17/2023 7.4  6.0 - 8.5 g/dL Final    Albumin 07/17/2023 5.0 (H)  3.8 - 4.9 g/dL Final                  **Please note reference interval change**     Globulin 07/17/2023 2.4  1.5 - 4.5 g/dL Final    A/G Ratio 07/17/2023 2.1  1.2 - 2.2 Final    Total Bilirubin 07/17/2023 0.2  0.0 - 1.2 mg/dL Final    Alkaline Phosphatase 07/17/2023 146 (H)  44 - 121 IU/L Final    AST (SGOT) 07/17/2023 21  0 - 40 IU/L Final    ALT (SGPT) 07/17/2023 12  0 - 32 IU/L Final    WBC 07/17/2023 7.7  3.4 - 10.8 x10E3/uL Final    RBC 07/17/2023 5.03  3.77 - 5.28 x10E6/uL Final    Hemoglobin 07/17/2023 15.2  11.1 - 15.9 g/dL Final    Hematocrit 07/17/2023 45.5  34.0 - 46.6 % Final    MCV 07/17/2023 91  79 - 97 fL Final    MCH 07/17/2023 30.2  26.6 - 33.0 pg Final    MCHC 07/17/2023 33.4  31.5 - 35.7 g/dL Final    RDW 07/17/2023 12.9  11.7 - 15.4 % Final    Platelets 07/17/2023 378  150 - 450 x10E3/uL Final    Neutrophil Rel % 07/17/2023 50  Not Estab. % Final    Lymphocyte Rel % 07/17/2023 39  Not Estab. % Final    Monocyte Rel % 07/17/2023 9  Not Estab. % Final    Eosinophil Rel % 07/17/2023 1  Not Estab. % Final    Basophil Rel % 07/17/2023 1  Not Estab. % Final    Neutrophils Absolute 07/17/2023 3.9  1.4 - 7.0 x10E3/uL Final    Lymphocytes Absolute 07/17/2023 3.0  0.7 - 3.1 x10E3/uL Final    Monocytes Absolute 07/17/2023 0.7  0.1 - 0.9 x10E3/uL Final    Eosinophils Absolute 07/17/2023 0.1  0.0 - 0.4 x10E3/uL Final    Basophils Absolute 07/17/2023 0.1  0.0 - 0.2 x10E3/uL Final    Immature Granulocyte Rel % 07/17/2023 0  Not Estab. % Final    Immature Grans Absolute 07/17/2023 0.0  0.0 - 0.1 x10E3/uL Final    Total Cholesterol 07/17/2023 232 (H)  100 - 199 mg/dL Final    Triglycerides 07/17/2023 119  0 - 149 mg/dL Final    HDL Cholesterol 07/17/2023 84  >39 mg/dL Final    VLDL Cholesterol Bartolo 07/17/2023 20  5 - 40 mg/dL Final    LDL Chol Calc ([facility]) 07/17/2023 128 (H)  0 - 99 mg/dL Final         Assessment & Plan   Diagnoses and all orders for this visit:    1. Physical exam, annual (Primary)    2. Encounter for screening mammogram for malignant neoplasm of breast  -     Mammo Screening  Digital Tomosynthesis Bilateral With CAD; Future    3. Pap smear for cervical cancer screening  -     IGP, Aptima HPV, Rfx 16 / 18,45    4. Personal history of nicotine dependence  -     CT Chest Low Dose Wo; Future    5. Chronic obstructive pulmonary disease, unspecified COPD type    Generally healthy 58-year-old white female here to the office today for a Pap smear and breast exam.  She was a little nervous.  Blood pressure is a little higher than usual at 159/92.  She does not take any blood pressure medicine at home.  She does not have high blood pressure.  Exam as above.  Will get her scheduled for a mammogram and CT chest to screen for lung cancer after February 2.  Pap smear sent off.  Exam was otherwise unremarkable.  Appears to be stable with regard to her COPD.  Continue Trelegy and albuterol.  Follow-up here again in 6 months or sooner if needed.  We typically do blood work once a year and she will be due for that in July of this year.        Call with any problems or concerns before next visit       Return in about 6 months (around 7/16/2024).      Much of this report is an electronic transcription of spoken language to printed text using Dragon dictation software.  As such, the subtleties and finesse of spoken language may permit erroneous, or at times, nonsensical words or phrases to be inadvertently transcribed; thus changes may be made at a later date to rectify these errors.     Rosalind Dutton MD1/16/202417:30 EST  This note has been electronically signed

## 2024-01-19 LAB
CYTOLOGIST CVX/VAG CYTO: NORMAL
CYTOLOGY CVX/VAG DOC CYTO: NORMAL
CYTOLOGY CVX/VAG DOC THIN PREP: NORMAL
DX ICD CODE: NORMAL
HIV 1 & 2 AB SER-IMP: NORMAL
HPV GENOTYPE REFLEX: NORMAL
HPV I/H RISK 4 DNA CVX QL PROBE+SIG AMP: NEGATIVE
OTHER STN SPEC: NORMAL
STAT OF ADQ CVX/VAG CYTO-IMP: NORMAL

## 2024-05-14 DIAGNOSIS — J44.9 CHRONIC OBSTRUCTIVE PULMONARY DISEASE, UNSPECIFIED COPD TYPE: ICD-10-CM

## 2024-05-14 RX ORDER — FLUTICASONE FUROATE, UMECLIDINIUM BROMIDE AND VILANTEROL TRIFENATATE 100; 62.5; 25 UG/1; UG/1; UG/1
POWDER RESPIRATORY (INHALATION)
Qty: 180 EACH | Refills: 3 | Status: SHIPPED | OUTPATIENT
Start: 2024-05-14

## 2024-07-30 ENCOUNTER — OFFICE VISIT (OUTPATIENT)
Dept: FAMILY MEDICINE CLINIC | Facility: CLINIC | Age: 59
End: 2024-07-30
Payer: COMMERCIAL

## 2024-07-30 VITALS
TEMPERATURE: 97.5 F | HEART RATE: 93 BPM | DIASTOLIC BLOOD PRESSURE: 82 MMHG | WEIGHT: 151 LBS | HEIGHT: 62 IN | BODY MASS INDEX: 27.79 KG/M2 | OXYGEN SATURATION: 95 % | SYSTOLIC BLOOD PRESSURE: 138 MMHG

## 2024-07-30 DIAGNOSIS — Z13.0 SCREENING FOR DEFICIENCY ANEMIA: ICD-10-CM

## 2024-07-30 DIAGNOSIS — J44.9 CHRONIC OBSTRUCTIVE PULMONARY DISEASE, UNSPECIFIED COPD TYPE: Primary | ICD-10-CM

## 2024-07-30 DIAGNOSIS — Z13.220 SCREENING FOR CHOLESTEROL LEVEL: ICD-10-CM

## 2024-07-30 DIAGNOSIS — Z13.1 SCREENING FOR DIABETES MELLITUS: ICD-10-CM

## 2024-07-30 DIAGNOSIS — M25.512 CHRONIC PAIN OF BOTH SHOULDERS: ICD-10-CM

## 2024-07-30 DIAGNOSIS — G89.29 CHRONIC PAIN OF BOTH SHOULDERS: ICD-10-CM

## 2024-07-30 DIAGNOSIS — Z11.59 NEED FOR HEPATITIS C SCREENING TEST: ICD-10-CM

## 2024-07-30 DIAGNOSIS — Z80.8 FAMILY HISTORY OF MALIGNANT MELANOMA OF SKIN: ICD-10-CM

## 2024-07-30 DIAGNOSIS — M25.511 CHRONIC PAIN OF BOTH SHOULDERS: ICD-10-CM

## 2024-07-30 PROCEDURE — 99214 OFFICE O/P EST MOD 30 MIN: CPT | Performed by: FAMILY MEDICINE

## 2024-07-30 NOTE — PROGRESS NOTES
Answers submitted by the patient for this visit:  Other (Submitted on 2024)  Please describe your symptoms.: Check up for COPD, Would like to be referred to dermatologist[facility] talk about pain in both shoulders., Urine test.  Have you had these symptoms before?: No  How long have you been having these symptoms?: Greater than 2 weeks  Primary Reason for Visit (Submitted on 2024)  What is the primary reason for your visit?: Other  Subjective   Yolanda Burk is a 59 y.o. female.   Chief Complaint   Patient presents with    COPD       History of Present Illness   59 y.o. female with history of COPD presents the office today to follow-up.  I last saw her 6 months ago for annual exam including Pap smear.  At that time I ordered her mammogram and CT chest to screen for lung cancer.    Last lab work to screen for diabetes and high cholesterol was just over a year ago.    Additional complaints today-sister  of melanoma-Would like to see a dermatologist.    Bilateral shoulder pain-I do not have any previous x-rays.  Back in  she had an MRI of her right shoulder which showed mild AC osteoarthritis, moderate periarticular marrow edema and subclavicular spurring.  Mild subacromial subdeltoid bursa edema/thickening consistent with bursitis.  No obvious rotator cuff tear.  Right shoulder feels a little better.  Left shoulder is now bothering her.  She is able to move anyway she wants to, but her shoulders hurt especially when she lifts something.    COPD is stable.  Breathing well unless she is carrying something.    She has lost a little weight.  Trying to help her  as well.          Patient Active Problem List    Diagnosis Date Noted    Adenomatous polyp of colon 2023     Note Last Updated: 2023     Colonoscopy-2023.  Tubular adenomas in the ascending, transverse, descending:      Chronic right shoulder pain 07/15/2022     Note Last Updated: 7/15/2022     Chronic  "bursitis      Chronic obstructive lung disease 2016     Note Last Updated: 2022     Had PFTs - at [place] - 2016.  Diagnosis was severe obstructive defect-reversible.  Alpha-1 antitrypsin testing was negative.  Chest x-ray around 16 or 17 had changes consistent with COPD.    Was on Trelegy - got expensive.    Does OK with Anoro. Has cough that she didn't have on Trelegy             Past Surgical History:   Procedure Laterality Date     SECTION      COLONOSCOPY W/ BIOPSIES  2023    Dr. Becerra -     Current Outpatient Medications on File Prior to Visit   Medication Sig    albuterol sulfate  (90 Base) MCG/ACT inhaler Inhale 2 puffs Every 4 (Four) Hours As Needed for Wheezing.    Fluticasone-Umeclidin-Vilant (Trelegy Ellipta) 100-62.5-25 MCG/ACT inhaler TAKE 1 PUFF EVERY DAY    meloxicam (MOBIC) 7.5 MG tablet Take 1 tablet by mouth 2 (Two) Times a Day.     No current facility-administered medications on file prior to visit.     No Known Allergies  Social History     Socioeconomic History    Marital status:    Tobacco Use    Smoking status: Every Day     Current packs/day: 0.50     Average packs/day: 0.5 packs/day for 42.6 years (21.3 ttl pk-yrs)     Types: Cigarettes     Start date:      Passive exposure: Current    Smokeless tobacco: Never    Tobacco comments:     she is ready to quit but also in not ready   Vaping Use    Vaping status: Never Used   Substance and Sexual Activity    Alcohol use: Not Currently     Comment: rarely    Drug use: Never    Sexual activity: Yes     Partners: Male     Family History   Problem Relation Age of Onset    No Known Problems Mother     Tuberculosis Father     Lung disease Father     Cancer Sister     Hypertension Sister     Hypertension Brother        Review of Systems    Objective   /82 (BP Location: Left arm, Patient Position: Sitting, Cuff Size: Adult)   Pulse 93   Temp 97.5 °F (36.4 °C) (Infrared)   Ht 157.5 cm (62.01\")   Wt " 68.5 kg (151 lb)   LMP  (LMP Unknown)   SpO2 95%   BMI 27.61 kg/m²   Physical Exam  Constitutional:       Appearance: She is well-developed.      Comments:      HENT:      Head: Normocephalic and atraumatic.   Eyes:      Conjunctiva/sclera: Conjunctivae normal.   Cardiovascular:      Rate and Rhythm: Normal rate.   Pulmonary:      Effort: Pulmonary effort is normal. No respiratory distress.   Musculoskeletal:         General: Normal range of motion.      Cervical back: Normal range of motion.      Right lower leg: No edema.      Left lower leg: No edema.   Skin:     General: Skin is warm and dry.      Findings: No rash.   Neurological:      Mental Status: She is alert and oriented to person, place, and time.      Gait: Gait normal.   Psychiatric:         Mood and Affect: Mood normal.         Behavior: Behavior normal.           No visits with results within 4 Month(s) from this visit.   Latest known visit with results is:   Office Visit on 01/16/2024   Component Date Value Ref Range Status    Diagnosis 01/16/2024 Comment   Final    NEGATIVE FOR INTRAEPITHELIAL LESION OR MALIGNANCY.    Specimen adequacy: 01/16/2024 Comment   Final    Comment: Satisfactory for evaluation.  Endocervical and/or squamous metaplastic  cells (endocervical component) are present.      Clinician Provided ICD-10: 01/16/2024 Comment   Final    Z12.4    Performed by: 01/16/2024 Comment   Final    Joe Craven, Cytotechnologist (ASCP)    . 01/16/2024 .   Final    Note: 01/16/2024 Comment   Final    Comment: The Pap smear is a screening test designed to aid in the detection of  premalignant and malignant conditions of the uterine cervix.  It is not a  diagnostic procedure and should not be used as the sole means of detecting  cervical cancer.  Both false-positive and false-negative reports do occur.      Method: 01/16/2024 Comment   Final    Comment: This liquid based ThinPrep(R) pap test was screened with the  use of an image guided  system.      HPV Aptima 01/16/2024 Negative  Negative Final    Comment: This nucleic acid amplification test detects fourteen high-risk  HPV types (16,18,31,33,35,39,45,51,52,56,58,59,66,68) without  differentiation.      HPV Genotype Reflex 01/16/2024 Comment   Final    Criteria not met, HPV Genotype not performed.       BMI is >= 25 and <30. (Overweight) The following options were offered after discussion;: exercise counseling/recommendations and nutrition counseling/recommendations     Assessment & Plan   Diagnoses and all orders for this visit:    1. Chronic obstructive pulmonary disease, unspecified COPD type (Primary)    2. Screening for diabetes mellitus  -     Comprehensive Metabolic Panel    3. Screening for cholesterol level  -     Lipid Panel    4. Screening for deficiency anemia  -     CBC & Differential    5. Chronic pain of both shoulders    6. Need for hepatitis C screening test  -     Hepatitis C Antibody    7. Family history of malignant melanoma of skin  -     Ambulatory Referral to Dermatology    COPD is stable.  Continue Trelegy and as needed albuterol.  Labs today to screen for diabetes, high cholesterol, anemia and hepatitis C.  Referral to dermatology for full skin check.  Chronic pain of both shoulders is likely bilateral bursitis.  She had MRI evidence of that in the right shoulder.  We talked about some stretching and strengthening exercises that can be done to help with positioning of the humeral head and decreased discomfort in the shoulders.  I provided her with a handout that describes those in detail.  Follow-up here in around 6 months to recheck her COPD.  Will plan on doing labs annually.        Call with any problems or concerns before next visit       Return in about 6 months (around 1/30/2025).      Much of this report is an electronic transcription of spoken language to printed text using Dragon dictation software.  As such, the subtleties and finesse of spoken language may  permit erroneous, or at times, nonsensical words or phrases to be inadvertently transcribed; thus changes may be made at a later date to rectify these errors.     Rosalind Dutton MD7/30/202416:54 EDT  This note has been electronically signed

## 2024-07-31 LAB
ALBUMIN SERPL-MCNC: 4.8 G/DL (ref 3.8–4.9)
ALP SERPL-CCNC: 141 IU/L (ref 44–121)
ALT SERPL-CCNC: 11 IU/L (ref 0–32)
AST SERPL-CCNC: 19 IU/L (ref 0–40)
BASOPHILS # BLD AUTO: 0.1 X10E3/UL (ref 0–0.2)
BASOPHILS NFR BLD AUTO: 1 %
BILIRUB SERPL-MCNC: <0.2 MG/DL (ref 0–1.2)
BUN SERPL-MCNC: 19 MG/DL (ref 6–24)
BUN/CREAT SERPL: 19 (ref 9–23)
CALCIUM SERPL-MCNC: 10.2 MG/DL (ref 8.7–10.2)
CHLORIDE SERPL-SCNC: 101 MMOL/L (ref 96–106)
CHOLEST SERPL-MCNC: 235 MG/DL (ref 100–199)
CO2 SERPL-SCNC: 24 MMOL/L (ref 20–29)
CREAT SERPL-MCNC: 1.02 MG/DL (ref 0.57–1)
EGFRCR SERPLBLD CKD-EPI 2021: 64 ML/MIN/1.73
EOSINOPHIL # BLD AUTO: 0.1 X10E3/UL (ref 0–0.4)
EOSINOPHIL NFR BLD AUTO: 1 %
ERYTHROCYTE [DISTWIDTH] IN BLOOD BY AUTOMATED COUNT: 13.1 % (ref 11.7–15.4)
GLOBULIN SER CALC-MCNC: 2.5 G/DL (ref 1.5–4.5)
GLUCOSE SERPL-MCNC: 93 MG/DL (ref 70–99)
HCT VFR BLD AUTO: 46.1 % (ref 34–46.6)
HCV IGG SERPL QL IA: NON REACTIVE
HDLC SERPL-MCNC: 75 MG/DL
HGB BLD-MCNC: 15 G/DL (ref 11.1–15.9)
IMM GRANULOCYTES # BLD AUTO: 0 X10E3/UL (ref 0–0.1)
IMM GRANULOCYTES NFR BLD AUTO: 0 %
LDLC SERPL CALC-MCNC: 133 MG/DL (ref 0–99)
LYMPHOCYTES # BLD AUTO: 3.8 X10E3/UL (ref 0.7–3.1)
LYMPHOCYTES NFR BLD AUTO: 41 %
MCH RBC QN AUTO: 30.4 PG (ref 26.6–33)
MCHC RBC AUTO-ENTMCNC: 32.5 G/DL (ref 31.5–35.7)
MCV RBC AUTO: 93 FL (ref 79–97)
MONOCYTES # BLD AUTO: 0.9 X10E3/UL (ref 0.1–0.9)
MONOCYTES NFR BLD AUTO: 9 %
NEUTROPHILS # BLD AUTO: 4.4 X10E3/UL (ref 1.4–7)
NEUTROPHILS NFR BLD AUTO: 48 %
PLATELET # BLD AUTO: 374 X10E3/UL (ref 150–450)
POTASSIUM SERPL-SCNC: 4.8 MMOL/L (ref 3.5–5.2)
PROT SERPL-MCNC: 7.3 G/DL (ref 6–8.5)
RBC # BLD AUTO: 4.94 X10E6/UL (ref 3.77–5.28)
SODIUM SERPL-SCNC: 139 MMOL/L (ref 134–144)
TRIGL SERPL-MCNC: 154 MG/DL (ref 0–149)
VLDLC SERPL CALC-MCNC: 27 MG/DL (ref 5–40)
WBC # BLD AUTO: 9.3 X10E3/UL (ref 3.4–10.8)

## 2024-10-09 ENCOUNTER — TELEPHONE (OUTPATIENT)
Dept: FAMILY MEDICINE CLINIC | Facility: CLINIC | Age: 59
End: 2024-10-09
Payer: COMMERCIAL

## 2024-10-09 RX ORDER — CLINDAMYCIN HCL 300 MG
300 CAPSULE ORAL 3 TIMES DAILY
Qty: 21 CAPSULE | Refills: 0 | Status: SHIPPED | OUTPATIENT
Start: 2024-10-09 | End: 2024-10-16

## 2024-10-09 NOTE — TELEPHONE ENCOUNTER
Caller: Yolanda Burk    Relationship: Self    Best call back number:     [phone] (Home)       What medication are you requesting: ANTIBIOTIC     What are your current symptoms: TOOTH PAIN AND HAS A DENTIST APPT ON 24TH    How long have you been experiencing symptoms: WEEK     Have you had these symptoms before:    [] Yes  [] No    Have you been treated for these symptoms before:   [] Yes  [] No    If a prescription is needed, what is your preferred pharmacy and phone number: [id number] - [place], [place] - [address] [facility] - [phone] [facility] [phone] FX     Additional notes:

## 2024-10-09 NOTE — TELEPHONE ENCOUNTER
Please tell her that I have sent a prescription for clindamycin to [facility] in [place] for her.

## 2024-10-18 ENCOUNTER — TELEPHONE (OUTPATIENT)
Dept: FAMILY MEDICINE CLINIC | Facility: CLINIC | Age: 59
End: 2024-10-18
Payer: COMMERCIAL

## 2024-10-18 RX ORDER — FLUCONAZOLE 150 MG/1
150 TABLET ORAL ONCE
Qty: 1 TABLET | Refills: 0 | Status: SHIPPED | OUTPATIENT
Start: 2024-10-18 | End: 2024-10-18

## 2024-10-18 NOTE — TELEPHONE ENCOUNTER
Caller: Yolanda Burk    Relationship: Self    Best call back number:     [phone] (Home)       What medication are you requesting: DIFLUCAN    What are your current symptoms: YEAST INFECTION FROM ANTIBIOTICS     How long have you been experiencing symptoms: A COUPLE OF DAYS    Have you had these symptoms before:    [x] Yes  [] No    Have you been treated for these symptoms before:   [x] Yes  [] No    If a prescription is needed, what is your preferred pharmacy and phone number: [id number] - [place], [place] - [address] [facility] - [phone] [facility] [phone] FX     Additional notes:

## 2024-11-25 ENCOUNTER — OFFICE VISIT (OUTPATIENT)
Dept: FAMILY MEDICINE CLINIC | Facility: CLINIC | Age: 59
End: 2024-11-25
Payer: COMMERCIAL

## 2024-11-25 VITALS
SYSTOLIC BLOOD PRESSURE: 134 MMHG | HEIGHT: 62 IN | DIASTOLIC BLOOD PRESSURE: 76 MMHG | BODY MASS INDEX: 26.68 KG/M2 | HEART RATE: 103 BPM | OXYGEN SATURATION: 96 % | TEMPERATURE: 97.1 F | WEIGHT: 145 LBS

## 2024-11-25 DIAGNOSIS — R19.7 DIARRHEA, UNSPECIFIED TYPE: ICD-10-CM

## 2024-11-25 DIAGNOSIS — R11.0 NAUSEA: ICD-10-CM

## 2024-11-25 DIAGNOSIS — N30.01 ACUTE CYSTITIS WITH HEMATURIA: ICD-10-CM

## 2024-11-25 DIAGNOSIS — K92.1 BLACK STOOLS: ICD-10-CM

## 2024-11-25 DIAGNOSIS — R10.30 LOWER ABDOMINAL PAIN: Primary | ICD-10-CM

## 2024-11-25 LAB
BILIRUB BLD-MCNC: ABNORMAL MG/DL
CLARITY, POC: CLEAR
COLOR UR: YELLOW
DEVELOPER EXPIRATION DATE: ABNORMAL
DEVELOPER LOT NUMBER: ABNORMAL
EXPIRATION DATE: ABNORMAL
EXPIRATION DATE: ABNORMAL
FECAL OCCULT BLOOD SCREEN, POC: POSITIVE
GLUCOSE UR STRIP-MCNC: NEGATIVE MG/DL
KETONES UR QL: ABNORMAL
LEUKOCYTE EST, POC: ABNORMAL
Lab: ABNORMAL
Lab: ABNORMAL
NEGATIVE CONTROL: NEGATIVE
NITRITE UR-MCNC: POSITIVE MG/ML
PH UR: 6 [PH] (ref 5–8)
POSITIVE CONTROL: POSITIVE
PROT UR STRIP-MCNC: ABNORMAL MG/DL
RBC # UR STRIP: ABNORMAL /UL
SP GR UR: 1.03 (ref 1–1.03)
UROBILINOGEN UR QL: ABNORMAL

## 2024-11-25 PROCEDURE — 82270 OCCULT BLOOD FECES: CPT | Performed by: NURSE PRACTITIONER

## 2024-11-25 PROCEDURE — 81003 URINALYSIS AUTO W/O SCOPE: CPT | Performed by: NURSE PRACTITIONER

## 2024-11-25 PROCEDURE — 99213 OFFICE O/P EST LOW 20 MIN: CPT | Performed by: NURSE PRACTITIONER

## 2024-11-25 RX ORDER — PANTOPRAZOLE SODIUM 20 MG/1
20 TABLET, DELAYED RELEASE ORAL 2 TIMES DAILY
Qty: 60 TABLET | Refills: 1 | Status: SHIPPED | OUTPATIENT
Start: 2024-11-25

## 2024-11-25 RX ORDER — GINSENG 100 MG
1 CAPSULE ORAL DAILY
Qty: 60 EACH | Refills: 6 | Status: SHIPPED | OUTPATIENT
Start: 2024-11-25

## 2024-11-25 RX ORDER — ONDANSETRON 4 MG/1
4 TABLET, FILM COATED ORAL EVERY 8 HOURS PRN
Qty: 30 TABLET | Refills: 2 | Status: SHIPPED | OUTPATIENT
Start: 2024-11-25

## 2024-11-25 RX ORDER — DICYCLOMINE HYDROCHLORIDE 10 MG/1
CAPSULE ORAL
Qty: 90 CAPSULE | Refills: 1 | Status: SHIPPED | OUTPATIENT
Start: 2024-11-25

## 2024-11-25 NOTE — PATIENT INSTRUCTIONS
Blood work today  UA/urine culture  Occult blood stool  Bentyl 10 mg 4 times a day till diarrhea stops and try to wean off  Acidophilus 2 capsules daily  Protonix 20 mg twice a day  Zofran 4 mg 3 times a day for 2 days then as needed  Try to get back to normal eating  Rocephin 1 g IM x 1  [place] diet  Follow-up urine in 1 week  Call office next week for update

## 2024-11-25 NOTE — PROGRESS NOTES
Yolanda Burk is a 59 y.o. female.     History of Present Illness  59-year-old white female smoker patient of Dr. Dutton who was seen by him in July who comes in today for follow-up visit    Blood pressure 134/76 heart rate 102 she denies any chest pain, dyspnea, tachycardia or dizziness    Patient's main complaint is 2-week history of watery diarrhea and a 2-day history of black stools and nausea along with lower abdominal pain.  She is trying to eat but is not eating as much she usually does.  She has been taking Imodium for the diarrhea but it has not helped.  We did manage to get a stool sample today occult blood stool was positive.  Patient does not have a history of ulcers but states she has been under a great deal of stress and she looks very pale today, however I have never seen this patient before and not sure what her normal is.  Will wait for CBC and other labs to come back .  She just had a colonoscopy last year but may need another 1 along with an EGD.  Will place her on Bentyl, acidophilus for her diarrhea and Protonix twice a day along with Zofran for nausea and encouraged her to try to get back to her normal eating patterns.  Also encouraged her to try to cut back on her smoking    I did check her urine today just to be thorough and she does have a urinary tract infection along with 2+ blood, nitrites  2+ bacteria and +2 ketones.  Will give her a gram of Rocephin since I do not want to put her on oral antibiotics that will aggravate her GI symptoms.  Encouraged her to do a follow-up urine in 1 week.  Patient is totally asymptomatic    Weight is 145 with a BMI 26.5.        Blood work today  UA/urine culture  Occult blood stool  Bentyl 10 mg 4 times a day till diarrhea stops and try to wean off  Acidophilus 2 capsules daily  Protonix 20 mg twice a day  Zofran 4 mg 3 times a day for 2 days then as needed  Try to get back to normal eating  Rocephin 1 g IM x 1  [place] diet  Follow-up urine in 1  "week  Call office next week for update             The following portions of the patient's history were reviewed and updated as appropriate: allergies, current medications, past family history, past medical history, past social history, past surgical history, and problem list.    Vitals:    24 1412   BP: 134/76   BP Location: Right arm   Patient Position: Sitting   Cuff Size: Adult   Pulse: 103   Temp: 97.1 °F (36.2 °C)   TempSrc: Infrared   SpO2: 96%   Weight: 65.8 kg (145 lb)   Height: 157.5 cm (62.01\")       Past Medical History:   Diagnosis Date    COPD (chronic obstructive pulmonary disease)      Past Surgical History:   Procedure Laterality Date     SECTION      COLONOSCOPY W/ BIOPSIES  2023    Dr. Becerra -     Family History   Problem Relation Age of Onset    No Known Problems Mother     Tuberculosis Father     Lung disease Father     Cancer Sister     Hypertension Sister     Hypertension Brother      Immunization History   Administered Date(s) Administered    31-influenza Vac Quardvalent Preservativ 2013, 2017, 10/22/2018    COVID-19 (MODERNA) 1st,2nd,3rd Dose Monovalent 2021, 2021    Flu Vaccine Intradermal Quad 18-64YR 2013, 10/31/2014, 10/26/2016    Flu Vaccine Quad PF 6-35MO 2019    Flu Vaccine Split Quad 2017, 10/22/2018, 10/22/2020, 2021    Fluzone  >6mos 10/28/2016, 10/02/2017    Fluzone (or Fluarix & Flulaval for VFC) >6mos 10/22/2020, 2021    Fluzone Quad >6mos (Multi-dose) 2015    Influenza Seasonal Injectable 10/26/2016    Pneumococcal Polysaccharide (PPSV23) 2016, 2016    Tdap 2019       Office Visit on 2024   Component Date Value Ref Range Status    Glucose 2024 93  70 - 99 mg/dL Final    BUN 2024 19  6 - 24 mg/dL Final    Creatinine 2024 1.02 (H)  0.57 - 1.00 mg/dL Final    EGFR Result 2024 64  >59 mL/min/1.73 Final    BUN/Creatinine Ratio 2024 19  9 - 23 " Final    Sodium 07/30/2024 139  134 - 144 mmol/L Final    Potassium 07/30/2024 4.8  3.5 - 5.2 mmol/L Final    Chloride 07/30/2024 101  96 - 106 mmol/L Final    Total CO2 07/30/2024 24  20 - 29 mmol/L Final    Calcium 07/30/2024 10.2  8.7 - 10.2 mg/dL Final    Total Protein 07/30/2024 7.3  6.0 - 8.5 g/dL Final    Albumin 07/30/2024 4.8  3.8 - 4.9 g/dL Final    Globulin 07/30/2024 2.5  1.5 - 4.5 g/dL Final    Total Bilirubin 07/30/2024 <0.2  0.0 - 1.2 mg/dL Final    Alkaline Phosphatase 07/30/2024 141 (H)  44 - 121 IU/L Final    AST (SGOT) 07/30/2024 19  0 - 40 IU/L Final    ALT (SGPT) 07/30/2024 11  0 - 32 IU/L Final    WBC 07/30/2024 9.3  3.4 - 10.8 x10E3/uL Final    RBC 07/30/2024 4.94  3.77 - 5.28 x10E6/uL Final    Hemoglobin 07/30/2024 15.0  11.1 - 15.9 g/dL Final    Hematocrit 07/30/2024 46.1  34.0 - 46.6 % Final    MCV 07/30/2024 93  79 - 97 fL Final    MCH 07/30/2024 30.4  26.6 - 33.0 pg Final    MCHC 07/30/2024 32.5  31.5 - 35.7 g/dL Final    RDW 07/30/2024 13.1  11.7 - 15.4 % Final    Platelets 07/30/2024 374  150 - 450 x10E3/uL Final    Neutrophil Rel % 07/30/2024 48  Not Estab. % Final    Lymphocyte Rel % 07/30/2024 41  Not Estab. % Final    Monocyte Rel % 07/30/2024 9  Not Estab. % Final    Eosinophil Rel % 07/30/2024 1  Not Estab. % Final    Basophil Rel % 07/30/2024 1  Not Estab. % Final    Neutrophils Absolute 07/30/2024 4.4  1.4 - 7.0 x10E3/uL Final    Lymphocytes Absolute 07/30/2024 3.8 (H)  0.7 - 3.1 x10E3/uL Final    Monocytes Absolute 07/30/2024 0.9  0.1 - 0.9 x10E3/uL Final    Eosinophils Absolute 07/30/2024 0.1  0.0 - 0.4 x10E3/uL Final    Basophils Absolute 07/30/2024 0.1  0.0 - 0.2 x10E3/uL Final    Immature Granulocyte Rel % 07/30/2024 0  Not Estab. % Final    Immature Grans Absolute 07/30/2024 0.0  0.0 - 0.1 x10E3/uL Final    Total Cholesterol 07/30/2024 235 (H)  100 - 199 mg/dL Final    Triglycerides 07/30/2024 154 (H)  0 - 149 mg/dL Final    HDL Cholesterol 07/30/2024 75  >39 mg/dL Final     VLDL Cholesterol [place] 07/30/2024 27  5 - 40 mg/dL Final    LDL Chol Calc (NIH) 07/30/2024 133 (H)  0 - 99 mg/dL Final    Hep C Virus Ab 07/30/2024 Non Reactive  Non Reactive Final    Comment: HCV antibody alone does not differentiate between previously  resolved infection and active infection. Equivocal and Reactive  HCV antibody results should be followed up with an HCV RNA test  to support the diagnosis of active HCV infection.           Review of Systems   Constitutional:  Positive for fatigue.   HENT: Negative.     Respiratory: Negative.     Cardiovascular: Negative.    Gastrointestinal:  Positive for abdominal pain, diarrhea and nausea.   Genitourinary: Negative.    Musculoskeletal: Negative.    Skin: Negative.    Neurological: Negative.    Psychiatric/Behavioral: Negative.         Objective   Physical Exam  Constitutional:       Appearance: Normal appearance.   HENT:      Head: Normocephalic.   Cardiovascular:      Rate and Rhythm: Normal rate and regular rhythm.      Pulses: Normal pulses.      Heart sounds: Normal heart sounds.   Pulmonary:      Effort: Pulmonary effort is normal.      Breath sounds: Normal breath sounds.   Abdominal:      Comments: Hyper bowel sounds   Musculoskeletal:         General: Normal range of motion.   Skin:     General: Skin is warm.   Neurological:      General: No focal deficit present.      Mental Status: She is alert and oriented to person, place, and time.   Psychiatric:         Mood and Affect: Mood normal.         Behavior: Behavior normal.         Procedures    Assessment & Plan   Diagnoses and all orders for this visit:    1. Lower abdominal pain (Primary)  -     CBC & Differential  -     Comprehensive Metabolic Panel  -     Lipase; Future  -     Hemoglobin A1c    2. Black stools  -     POCT occult blood x 1 stool    3. Acute cystitis with hematuria  -     cefTRIAXone (ROCEPHIN) 1 g in lidocaine (XYLOCAINE) 1 % 2.1 mL IM only syringe    Other orders  -     dicyclomine  (BENTYL) 10 MG capsule; One capsule 4x a day until diarrhea stops then try to wean off  Dispense: 90 capsule; Refill: 1  -     Probiotic Product (Acidophilus High-Potency) capsule; Take 1 tablet by mouth Daily.  Dispense: 60 each; Refill: 6  -     pantoprazole (Protonix) 20 MG EC tablet; Take 1 tablet by mouth 2 (Two) Times a Day.  Dispense: 60 tablet; Refill: 1  -     ondansetron (Zofran) 4 MG tablet; Take 1 tablet by mouth Every 8 (Eight) Hours As Needed for Nausea.  Dispense: 30 tablet; Refill: 2           Current Outpatient Medications:     albuterol sulfate  (90 Base) MCG/ACT inhaler, Inhale 2 puffs Every 4 (Four) Hours As Needed for Wheezing., Disp: 18 g, Rfl: 2    Fluticasone-Umeclidin-Vilant (Trelegy Ellipta) 100-62.5-25 MCG/ACT inhaler, TAKE 1 PUFF EVERY DAY, Disp: 180 each, Rfl: 3    meloxicam (MOBIC) 7.5 MG tablet, Take 1 tablet by mouth 2 (Two) Times a Day., Disp: 180 tablet, Rfl: 3    dicyclomine (BENTYL) 10 MG capsule, One capsule 4x a day until diarrhea stops then try to wean off, Disp: 90 capsule, Rfl: 1    ondansetron (Zofran) 4 MG tablet, Take 1 tablet by mouth Every 8 (Eight) Hours As Needed for Nausea., Disp: 30 tablet, Rfl: 2    pantoprazole (Protonix) 20 MG EC tablet, Take 1 tablet by mouth 2 (Two) Times a Day., Disp: 60 tablet, Rfl: 1    Probiotic Product (Acidophilus High-Potency) capsule, Take 1 tablet by mouth Daily., Disp: 60 each, Rfl: 6    Current Facility-Administered Medications:     cefTRIAXone (ROCEPHIN) 1 g in lidocaine (XYLOCAINE) 1 % 2.1 mL IM only syringe, 1 g, Intramuscular, Once, Linette Brooks, AMY Tran 11/25/2024 15:22 EST  This note has been electronically signed

## 2024-11-26 LAB
ALBUMIN SERPL-MCNC: 4 G/DL (ref 3.8–4.9)
ALP SERPL-CCNC: 125 IU/L (ref 44–121)
ALT SERPL-CCNC: 15 IU/L (ref 0–32)
AST SERPL-CCNC: 22 IU/L (ref 0–40)
BASOPHILS # BLD AUTO: 0.1 X10E3/UL (ref 0–0.2)
BASOPHILS NFR BLD AUTO: 1 %
BILIRUB SERPL-MCNC: 0.3 MG/DL (ref 0–1.2)
BUN SERPL-MCNC: 19 MG/DL (ref 6–24)
BUN/CREAT SERPL: 22 (ref 9–23)
CALCIUM SERPL-MCNC: 9.4 MG/DL (ref 8.7–10.2)
CHLORIDE SERPL-SCNC: 97 MMOL/L (ref 96–106)
CO2 SERPL-SCNC: 22 MMOL/L (ref 20–29)
CREAT SERPL-MCNC: 0.86 MG/DL (ref 0.57–1)
EGFRCR SERPLBLD CKD-EPI 2021: 78 ML/MIN/1.73
EOSINOPHIL # BLD AUTO: 0.1 X10E3/UL (ref 0–0.4)
EOSINOPHIL NFR BLD AUTO: 0 %
ERYTHROCYTE [DISTWIDTH] IN BLOOD BY AUTOMATED COUNT: 12.4 % (ref 11.7–15.4)
GLOBULIN SER CALC-MCNC: 2.5 G/DL (ref 1.5–4.5)
GLUCOSE SERPL-MCNC: 93 MG/DL (ref 70–99)
HBA1C MFR BLD: 5.7 % (ref 4.8–5.6)
HCT VFR BLD AUTO: 41.7 % (ref 34–46.6)
HGB BLD-MCNC: 13.8 G/DL (ref 11.1–15.9)
IMM GRANULOCYTES # BLD AUTO: 0.2 X10E3/UL (ref 0–0.1)
IMM GRANULOCYTES NFR BLD AUTO: 1 %
LYMPHOCYTES # BLD AUTO: 2.8 X10E3/UL (ref 0.7–3.1)
LYMPHOCYTES NFR BLD AUTO: 13 %
MCH RBC QN AUTO: 30.3 PG (ref 26.6–33)
MCHC RBC AUTO-ENTMCNC: 33.1 G/DL (ref 31.5–35.7)
MCV RBC AUTO: 91 FL (ref 79–97)
MONOCYTES # BLD AUTO: 1.7 X10E3/UL (ref 0.1–0.9)
MONOCYTES NFR BLD AUTO: 8 %
NEUTROPHILS # BLD AUTO: 16.8 X10E3/UL (ref 1.4–7)
NEUTROPHILS NFR BLD AUTO: 77 %
PLATELET # BLD AUTO: 594 X10E3/UL (ref 150–450)
POTASSIUM SERPL-SCNC: 5 MMOL/L (ref 3.5–5.2)
PROT SERPL-MCNC: 6.5 G/DL (ref 6–8.5)
RBC # BLD AUTO: 4.56 X10E6/UL (ref 3.77–5.28)
SODIUM SERPL-SCNC: 134 MMOL/L (ref 134–144)
WBC # BLD AUTO: 21.7 X10E3/UL (ref 3.4–10.8)

## 2024-11-27 ENCOUNTER — TELEPHONE (OUTPATIENT)
Dept: FAMILY MEDICINE CLINIC | Facility: CLINIC | Age: 59
End: 2024-11-27

## 2024-11-27 DIAGNOSIS — R19.7 DIARRHEA, UNSPECIFIED TYPE: Primary | ICD-10-CM

## 2024-11-27 RX ORDER — METRONIDAZOLE 500 MG/1
500 TABLET ORAL 3 TIMES DAILY
Qty: 42 TABLET | Refills: 0 | Status: SHIPPED | OUTPATIENT
Start: 2024-11-27

## 2024-11-27 NOTE — TELEPHONE ENCOUNTER
PATIENT WAS IN OFFICE ON MONDAY FOR DIARRHEA WAS PRESCRIBED BENTYL AND STATES ITS NOT BETTER BUT NOT WORSE WANTS TO KNOW WHAT YOU SUGGEST.

## 2024-11-28 LAB
BACTERIA UR CULT: NORMAL
BACTERIA UR CULT: NORMAL

## 2024-12-03 ENCOUNTER — TELEPHONE (OUTPATIENT)
Dept: FAMILY MEDICINE CLINIC | Facility: CLINIC | Age: 59
End: 2024-12-03
Payer: COMMERCIAL

## 2024-12-03 NOTE — TELEPHONE ENCOUNTER
Caller: Yolanda Burk    Relationship: Self    Best call back number: [phone]         Who are you requesting to speak with (clinical staff, provider,  specific staff member): PCP OR CLINICAL        What was the call regarding: PATIENT WAS IN ON 11/27/24 AND THEN WENT TO THE ER LATER AND DIAGNOSED WITH CDIFF.  SHE IS ON VANCOMYCIN 4 TIMES A DAY AND IS STILL REALLY WEAK.  SINCE SHE HAS NEVER HEARD OF THIS, ASKING WHAT SHE SHOULD BE EXPECTING AND LOOKING FOR.  PLEASE CALL TO ADVISE.   SHE IS STILL CRAMPING AND HAS DIARRHEA.  NO FEVER.

## 2024-12-09 ENCOUNTER — OFFICE VISIT (OUTPATIENT)
Dept: FAMILY MEDICINE CLINIC | Facility: CLINIC | Age: 59
End: 2024-12-09
Payer: COMMERCIAL

## 2024-12-09 VITALS
BODY MASS INDEX: 26.87 KG/M2 | SYSTOLIC BLOOD PRESSURE: 120 MMHG | TEMPERATURE: 97.1 F | HEIGHT: 62 IN | HEART RATE: 93 BPM | OXYGEN SATURATION: 96 % | WEIGHT: 146 LBS | DIASTOLIC BLOOD PRESSURE: 72 MMHG

## 2024-12-09 DIAGNOSIS — A04.72 C. DIFFICILE DIARRHEA: Primary | ICD-10-CM

## 2024-12-09 PROCEDURE — 99213 OFFICE O/P EST LOW 20 MIN: CPT | Performed by: NURSE PRACTITIONER

## 2024-12-09 RX ORDER — VANCOMYCIN HYDROCHLORIDE 125 MG/1
125 CAPSULE ORAL 4 TIMES DAILY
Qty: 28 CAPSULE | Refills: 0 | Status: SHIPPED | OUTPATIENT
Start: 2024-12-09

## 2024-12-09 RX ORDER — FLUCONAZOLE 150 MG/1
150 TABLET ORAL ONCE
Qty: 1 TABLET | Refills: 0 | Status: SHIPPED | OUTPATIENT
Start: 2024-12-09 | End: 2024-12-09

## 2024-12-09 NOTE — PROGRESS NOTES
"    Yolanda Burk is a 59 y.o. female.     History of Present Illness  59-year-old white female smoker who I saw on November 25 for gross diarrhea.  At that time I sent her home to get a stool sample but she was unable to do it so she went to the ER and they got 1 there through the rectal.  She did show positive for C. difficile she was placed on vancomycin because I already had her on Flagyl however they only gave her 125 mg twice a day for 7 days instead of 4 times a day so I reordered her another week of the vancomycin.  She said the diarrhea for the most part has resolved however her abdomen is very bloated and she is having a lot of gas pains.  I already have her on acidophilus I told her to take 2 Gas-X 4 times a day to help relieve the gas.  Hopefully when she gets off antibiotics her bloating will improve.  I also gave her Diflucan since does not get a yeast infection.  I did warn her that this is highly contagious to be careful at home around her     Blood pressure 120/76 heart rate 90 she denies any chest pain, dyspnea, tachycardia or dizziness.  Weight is 146 with a BMI 26.7            Vancomycin 125 4 times daily x 7 days  Gas-X 2 tablets 4 times a day  Continue acidophilus 2 capsules daily  Finish Flagyl  Strict handwashing and avoid transmission of C. difficile at home  Try to get back to regular eating  After symptoms have resolved need to recheck stool       The following portions of the patient's history were reviewed and updated as appropriate: allergies, current medications, past family history, past medical history, past social history, past surgical history, and problem list.    Vitals:    12/09/24 1345   BP: 120/72   BP Location: Right arm   Patient Position: Sitting   Cuff Size: Adult   Pulse: 93   Temp: 97.1 °F (36.2 °C)   TempSrc: Infrared   SpO2: 96%   Weight: 66.2 kg (146 lb)   Height: 157.5 cm (62.01\")       Past Medical History:   Diagnosis Date    COPD (chronic obstructive " pulmonary disease)      Past Surgical History:   Procedure Laterality Date     SECTION  1993    COLONOSCOPY W/ BIOPSIES  2023    Dr. Becerra -     Family History   Problem Relation Age of Onset    No Known Problems Mother     Tuberculosis Father     Lung disease Father     Cancer Sister     Hypertension Sister     Hypertension Brother      Immunization History   Administered Date(s) Administered    31-influenza Vac Quardvalent Preservativ 2013, 2017, 10/22/2018    COVID-19 (MODERNA) 1st,2nd,3rd Dose Monovalent 2021, 2021    Flu Vaccine Intradermal Quad 18-64YR 2013, 10/31/2014, 10/26/2016    Flu Vaccine Quad PF 6-35MO 2019    Flu Vaccine Split Quad 2017, 10/22/2018, 10/22/2020, 2021    Fluzone  >6mos 10/28/2016, 10/02/2017    Fluzone (or Fluarix & Flulaval for VFC) >6mos 10/22/2020, 2021    Fluzone Quad >6mos (Multi-dose) 2015    Influenza Seasonal Injectable 10/26/2016    Pneumococcal Polysaccharide (PPSV23) 2016, 2016    Tdap 2019       Results Encounter on 2024   Component Date Value Ref Range Status    Lipase 2024 36  14 - 72 U/L Final         Review of Systems   Constitutional: Negative.    HENT: Negative.     Respiratory: Negative.     Cardiovascular: Negative.    Gastrointestinal:  Positive for abdominal distention and diarrhea.   Genitourinary: Negative.    Musculoskeletal: Negative.    Skin: Negative.    Neurological: Negative.    Psychiatric/Behavioral: Negative.         Objective   Physical Exam  Constitutional:       Appearance: Normal appearance.   HENT:      Head: Normocephalic.   Cardiovascular:      Rate and Rhythm: Normal rate and regular rhythm.      Pulses: Normal pulses.      Heart sounds: Normal heart sounds.   Pulmonary:      Effort: Pulmonary effort is normal.      Breath sounds: Normal breath sounds.   Abdominal:      Comments: Abdomen is distended but she is having small amounts of  diarrhea   Musculoskeletal:         General: Normal range of motion.   Skin:     General: Skin is warm.   Neurological:      General: No focal deficit present.      Mental Status: She is alert and oriented to person, place, and time.   Psychiatric:         Mood and Affect: Mood normal.         Behavior: Behavior normal.         Procedures    Assessment & Plan   Diagnoses and all orders for this visit:    1. C. difficile diarrhea (Primary)    Other orders  -     vancomycin (VANCOCIN) 125 MG capsule; Take 1 capsule by mouth 4 (Four) Times a Day.  Dispense: 28 capsule; Refill: 0  -     fluconazole (Diflucan) 150 MG tablet; Take 1 tablet by mouth 1 (One) Time for 1 dose.  Dispense: 1 tablet; Refill: 0           Current Outpatient Medications:     albuterol sulfate  (90 Base) MCG/ACT inhaler, Inhale 2 puffs Every 4 (Four) Hours As Needed for Wheezing., Disp: 18 g, Rfl: 2    dicyclomine (BENTYL) 10 MG capsule, One capsule 4x a day until diarrhea stops then try to wean off, Disp: 90 capsule, Rfl: 1    Fluticasone-Umeclidin-Vilant (Trelegy Ellipta) 100-62.5-25 MCG/ACT inhaler, TAKE 1 PUFF EVERY DAY, Disp: 180 each, Rfl: 3    meloxicam (MOBIC) 7.5 MG tablet, Take 1 tablet by mouth 2 (Two) Times a Day., Disp: 180 tablet, Rfl: 3    metroNIDAZOLE (Flagyl) 500 MG tablet, Take 1 tablet by mouth 3 (Three) Times a Day., Disp: 42 tablet, Rfl: 0    ondansetron (Zofran) 4 MG tablet, Take 1 tablet by mouth Every 8 (Eight) Hours As Needed for Nausea., Disp: 30 tablet, Rfl: 2    pantoprazole (Protonix) 20 MG EC tablet, Take 1 tablet by mouth 2 (Two) Times a Day., Disp: 60 tablet, Rfl: 1    Probiotic Product (Acidophilus High-Potency) capsule, Take 1 tablet by mouth Daily., Disp: 60 each, Rfl: 6    fluconazole (Diflucan) 150 MG tablet, Take 1 tablet by mouth 1 (One) Time for 1 dose., Disp: 1 tablet, Rfl: 0    vancomycin (VANCOCIN) 125 MG capsule, Take 1 capsule by mouth 4 (Four) Times a Day., Disp: 28 capsule, Rfl: 0            Linette Brooks, APRN 12/9/2024 14:32 EST  This note has been electronically signed

## 2024-12-09 NOTE — PATIENT INSTRUCTIONS
Vancomycin 125 4 times daily x 7 days  Gas-X 2 tablets 4 times a day  Continue acidophilus 2 capsules daily  Finish Flagyl  Strict handwashing and avoid transmission of C. difficile at home  After symptoms have resolved need to recheck stool

## 2024-12-17 RX ORDER — PANTOPRAZOLE SODIUM 20 MG/1
20 TABLET, DELAYED RELEASE ORAL 2 TIMES DAILY
Qty: 60 TABLET | Refills: 1 | Status: SHIPPED | OUTPATIENT
Start: 2024-12-17 | End: 2024-12-19 | Stop reason: SDUPTHER

## 2024-12-19 RX ORDER — PANTOPRAZOLE SODIUM 20 MG/1
20 TABLET, DELAYED RELEASE ORAL 2 TIMES DAILY
Qty: 180 TABLET | Refills: 1 | Status: SHIPPED | OUTPATIENT
Start: 2024-12-19

## 2025-02-04 ENCOUNTER — OFFICE VISIT (OUTPATIENT)
Dept: FAMILY MEDICINE CLINIC | Facility: CLINIC | Age: 60
End: 2025-02-04
Payer: COMMERCIAL

## 2025-02-04 VITALS
DIASTOLIC BLOOD PRESSURE: 86 MMHG | HEIGHT: 62 IN | HEART RATE: 100 BPM | RESPIRATION RATE: 18 BRPM | TEMPERATURE: 97.7 F | SYSTOLIC BLOOD PRESSURE: 130 MMHG | WEIGHT: 142.6 LBS | BODY MASS INDEX: 26.24 KG/M2 | OXYGEN SATURATION: 99 %

## 2025-02-04 DIAGNOSIS — A04.72 C. DIFFICILE DIARRHEA: ICD-10-CM

## 2025-02-04 DIAGNOSIS — J44.9 CHRONIC OBSTRUCTIVE PULMONARY DISEASE, UNSPECIFIED COPD TYPE: Primary | ICD-10-CM

## 2025-02-04 DIAGNOSIS — Z87.891 PERSONAL HISTORY OF NICOTINE DEPENDENCE: ICD-10-CM

## 2025-02-04 DIAGNOSIS — Z12.31 ENCOUNTER FOR SCREENING MAMMOGRAM FOR MALIGNANT NEOPLASM OF BREAST: ICD-10-CM

## 2025-02-04 PROCEDURE — 99214 OFFICE O/P EST MOD 30 MIN: CPT | Performed by: FAMILY MEDICINE

## 2025-02-04 RX ORDER — ALBUTEROL SULFATE 90 UG/1
2 INHALANT RESPIRATORY (INHALATION) EVERY 4 HOURS PRN
Qty: 18 G | Refills: 2 | Status: SHIPPED | OUTPATIENT
Start: 2025-02-04

## 2025-02-04 RX ORDER — FLUTICASONE FUROATE, UMECLIDINIUM BROMIDE AND VILANTEROL TRIFENATATE 100; 62.5; 25 UG/1; UG/1; UG/1
1 POWDER RESPIRATORY (INHALATION)
Qty: 3 EACH | Refills: 3 | Status: SHIPPED | OUTPATIENT
Start: 2025-02-04

## 2025-02-04 NOTE — PROGRESS NOTES
Subjective   Yolanda Burk is a 59 y.o. female.   Chief Complaint   Patient presents with    COPD       History of Present Illness   59 y.o. female presents to the office today basically for prescheduled 6-month follow-up on COPD.    Review of chart indicates that she saw Linette on November 25 and December 9.  Sometime after November 25 she went to the ER for diarrhea and tested positive for C. difficile.  She was treated with vancomycin and Flagyl for a while and by the follow-up visit on December 9 diarrhea for the most part had resolved but she was have a lot of bloating and gas.  Linette started her on Gas-X, probiotics I gave her Diflucan in case she got a yeast infection from the antibiotics.    Last Pap smear was January 16, 2024.  Last mammogram was February 12, 2024.  No mammographic evidence of breast cancer.  CT chest low-dose to screen for lung cancer February 12, 2024.  No suspicious pulmonary nodules.    She had labs done 6 months ago to screen for high cholesterol.  She also had an A1c back in November and it was 5.7%.    History of Present Illness  The patient presents for evaluation of C. difficile infection and respiratory issues.    She reports a resolution of her C. difficile infection, which occurred during the week of Thanksgiving. She experienced significant weight loss, dropping to 135 pounds, but has since regained some weight and currently weighs 142 pounds. She recalls being in [facility] with her  who had hernia surgery. She also mentions a dental procedure on 11/14/2024, after which she began to feel unwell within 2 to 3 days. She describes her bowel movements as predominantly solid and formed, with occasional instances of thin, paper-like stools. She reports no presence of blood in her stool or any abdominal cramping or pain. Her diet is largely unrestricted, although she has identified certain foods that cause discomfort the following morning. She maintains a  strict hand hygiene regimen, washing her hands for 24 seconds with soap and water. She has been taking Florastor daily in the morning.    She reports no respiratory complaints and continues to use Trelegy, with a supply sufficient for one more month. She estimates that she has used albuterol approximately five times over the past year.    MEDICATIONS  Florastor, Trelegy, albuterol      Patient Active Problem List    Diagnosis Date Noted    C. difficile diarrhea 2024    Adenomatous polyp of colon 2023     Note Last Updated: 2023     Colonoscopy-2023.  Tubular adenomas in the ascending, transverse, descending:      Chronic right shoulder pain 07/15/2022     Note Last Updated: 7/15/2022     Chronic bursitis      Chronic obstructive lung disease 2016     Note Last Updated: 2022     Had PFTs - at [place] - .  Diagnosis was severe obstructive defect-reversible.  Alpha-1 antitrypsin testing was negative.  Chest x-ray around 16 or 17 had changes consistent with COPD.    Was on Trelegy - got expensive.    Does OK with Anoro. Has cough that she didn't have on Trelegy             Past Surgical History:   Procedure Laterality Date     SECTION      COLONOSCOPY W/ BIOPSIES  2023    Dr. Becerra -     Current Outpatient Medications on File Prior to Visit   Medication Sig    Probiotic Product (Acidophilus High-Potency) capsule Take 1 tablet by mouth Daily.    [DISCONTINUED] albuterol sulfate  (90 Base) MCG/ACT inhaler Inhale 2 puffs Every 4 (Four) Hours As Needed for Wheezing.    [DISCONTINUED] Fluticasone-Umeclidin-Vilant (Trelegy Ellipta) 100-62.5-25 MCG/ACT inhaler TAKE 1 PUFF EVERY DAY    [DISCONTINUED] dicyclomine (BENTYL) 10 MG capsule One capsule 4x a day until diarrhea stops then try to wean off    [DISCONTINUED] meloxicam (MOBIC) 7.5 MG tablet Take 1 tablet by mouth 2 (Two) Times a Day.    [DISCONTINUED] metroNIDAZOLE (Flagyl) 500 MG tablet Take 1 tablet by mouth 3  "(Three) Times a Day.    [DISCONTINUED] ondansetron (Zofran) 4 MG tablet Take 1 tablet by mouth Every 8 (Eight) Hours As Needed for Nausea.    [DISCONTINUED] pantoprazole (Protonix) 20 MG EC tablet Take 1 tablet by mouth 2 (Two) Times a Day.    [DISCONTINUED] vancomycin (VANCOCIN) 125 MG capsule Take 1 capsule by mouth 4 (Four) Times a Day.     No current facility-administered medications on file prior to visit.     No Known Allergies  Social History     Socioeconomic History    Marital status:    Tobacco Use    Smoking status: Every Day     Current packs/day: 0.50     Average packs/day: 0.5 packs/day for 43.1 years (21.5 ttl pk-yrs)     Types: Cigarettes     Start date: 1982     Passive exposure: Current    Smokeless tobacco: Never    Tobacco comments:     she is ready to quit but also in not ready   Vaping Use    Vaping status: Never Used   Substance and Sexual Activity    Alcohol use: Not Currently     Comment: rarely    Drug use: Never    Sexual activity: Yes     Partners: Male     Family History   Problem Relation Age of Onset    No Known Problems Mother     Tuberculosis Father     Lung disease Father     Cancer Sister     Hypertension Sister     Hypertension Brother        Review of Systems    Objective   /86 (BP Location: Right arm, Patient Position: Sitting, Cuff Size: Adult)   Pulse 100   Temp 97.7 °F (36.5 °C) (Infrared)   Resp 18   Ht 157.5 cm (62.01\")   Wt 64.7 kg (142 lb 9.6 oz)   LMP  (LMP Unknown)   SpO2 99%   Breastfeeding No   BMI 26.07 kg/m²   Physical Exam  Constitutional:       Appearance: She is well-developed. She is not toxic-appearing.      Comments:      HENT:      Head: Normocephalic and atraumatic.   Eyes:      Conjunctiva/sclera: Conjunctivae normal.   Cardiovascular:      Rate and Rhythm: Normal rate and regular rhythm.      Heart sounds: No murmur heard.  Pulmonary:      Effort: Pulmonary effort is normal.      Breath sounds: No wheezing, rhonchi or rales. "   Musculoskeletal:         General: Normal range of motion.      Cervical back: Normal range of motion.      Right lower leg: No edema.      Left lower leg: No edema.   Skin:     General: Skin is warm and dry.      Findings: No rash.   Neurological:      Mental Status: She is alert and oriented to person, place, and time.   Psychiatric:         Mood and Affect: Mood normal.         Behavior: Behavior normal.       Physical Exam  Lungs are clear.    Vital Signs  Weight is 142 pounds.      Results Encounter on 11/30/2024   Component Date Value Ref Range Status    Lipase 11/27/2024 36  14 - 72 U/L Final   Orders Only on 11/25/2024   Component Date Value Ref Range Status    Urine Culture 11/25/2024 Final report   Final    Result 1 11/25/2024 Comment   Final    Comment: Mixed urogenital judson  50,000-100,000 colony forming units per mL     Office Visit on 11/25/2024   Component Date Value Ref Range Status    WBC 11/25/2024 21.7 (H)  3.4 - 10.8 x10E3/uL Final    Comment: **Effective December 2, 2024 profile [id number] WBC will be made**    non-orderable as a stand-alone order code.      RBC 11/25/2024 4.56  3.77 - 5.28 x10E6/uL Final    Hemoglobin 11/25/2024 13.8  11.1 - 15.9 g/dL Final    Hematocrit 11/25/2024 41.7  34.0 - 46.6 % Final    MCV 11/25/2024 91  79 - 97 fL Final    MCH 11/25/2024 30.3  26.6 - 33.0 pg Final    MCHC 11/25/2024 33.1  31.5 - 35.7 g/dL Final    RDW 11/25/2024 12.4  11.7 - 15.4 % Final    Platelets 11/25/2024 594 (H)  150 - 450 x10E3/uL Final    Neutrophil Rel % 11/25/2024 77  Not Estab. % Final    Lymphocyte Rel % 11/25/2024 13  Not Estab. % Final    Monocyte Rel % 11/25/2024 8  Not Estab. % Final    Eosinophil Rel % 11/25/2024 0  Not Estab. % Final    Basophil Rel % 11/25/2024 1  Not Estab. % Final    Neutrophils Absolute 11/25/2024 16.8 (H)  1.4 - 7.0 x10E3/uL Final    Lymphocytes Absolute 11/25/2024 2.8  0.7 - 3.1 x10E3/uL Final    Monocytes Absolute 11/25/2024 1.7 (H)  0.1 - 0.9 x10E3/uL Final     Eosinophils Absolute 11/25/2024 0.1  0.0 - 0.4 x10E3/uL Final    Basophils Absolute 11/25/2024 0.1  0.0 - 0.2 x10E3/uL Final    Immature Granulocyte Rel % 11/25/2024 1  Not Estab. % Final    Immature Grans Absolute 11/25/2024 0.2 (H)  0.0 - 0.1 x10E3/uL Final    Comment: (An elevated percentage of Immature Granulocytes has not been found  to be clinically significant as a sole clinical predictor of disease.  Does NOT include bands or blast cells.  Pregnancy associated  physiological leukocytosis may also show increased immature  granulocytes without clinical significance.)      Glucose 11/25/2024 93  70 - 99 mg/dL Final    BUN 11/25/2024 19  6 - 24 mg/dL Final    Creatinine 11/25/2024 0.86  0.57 - 1.00 mg/dL Final    EGFR Result 11/25/2024 78  >59 mL/min/1.73 Final    BUN/Creatinine Ratio 11/25/2024 22  9 - 23 Final    Sodium 11/25/2024 134  134 - 144 mmol/L Final    Potassium 11/25/2024 5.0  3.5 - 5.2 mmol/L Final    Chloride 11/25/2024 97  96 - 106 mmol/L Final    Total CO2 11/25/2024 22  20 - 29 mmol/L Final    Calcium 11/25/2024 9.4  8.7 - 10.2 mg/dL Final    Total Protein 11/25/2024 6.5  6.0 - 8.5 g/dL Final    Albumin 11/25/2024 4.0  3.8 - 4.9 g/dL Final    Globulin 11/25/2024 2.5  1.5 - 4.5 g/dL Final    Total Bilirubin 11/25/2024 0.3  0.0 - 1.2 mg/dL Final    Alkaline Phosphatase 11/25/2024 125 (H)  44 - 121 IU/L Final    AST (SGOT) 11/25/2024 22  0 - 40 IU/L Final    ALT (SGPT) 11/25/2024 15  0 - 32 IU/L Final    Fecal Occult Blood 11/25/2024 Positive (A)  Negative Corrected    Lot Number 11/25/2024 [id number]   Final    Expiration Date 11/25/2024 3,312,025   Final    DEVELOPER LOT NUMBER 11/25/2024 [id number]   Final    DEVELOPER EXPIRATION DATE 11/25/2024 3,312,025   Final    Positive Control 11/25/2024 Positive  Positive Final    Negative Control 11/25/2024 Negative  Negative Final    Hemoglobin A1C 11/25/2024 5.7 (H)  4.8 - 5.6 % Final    Comment:          Prediabetes: 5.7 - 6.4           Diabetes: >6.4            Glycemic control for adults with diabetes: <7.0      Color 11/25/2024 Yellow  Yellow, Straw, Dark Yellow, Angela Final    Clarity, UA 11/25/2024 Clear  Clear Final    Specific Gravity  11/25/2024 1.030  1.005 - 1.030 Final    pH, Urine 11/25/2024 6.0  5.0 - 8.0 Final    Leukocytes 11/25/2024 Small (1+) (A)  Negative Final    Nitrite, UA 11/25/2024 Positive (A)  Negative Final    Protein, POC 11/25/2024 1+ (A)  Negative mg/dL Final    Glucose, UA 11/25/2024 Negative  Negative mg/dL Final    Ketones, UA 11/25/2024 2+ (A)  Negative Final    Urobilinogen, UA 11/25/2024 1 E.U./dL (A)  Normal, 0.2 E.U./dL Final    Bilirubin 11/25/2024 Moderate (2+) (A)  Negative Final    Blood, UA 11/25/2024 Small (A)  Negative Final    Lot Number 11/25/2024 [id number]   Final    Expiration Date 11/25/2024 3,046,026   Final     Results            Assessment & Plan   Diagnoses and all orders for this visit:    1. Chronic obstructive pulmonary disease, unspecified COPD type (Primary)  -     Fluticasone-Umeclidin-Vilant (Trelegy Ellipta) 100-62.5-25 MCG/ACT inhaler; Inhale 1 puff Daily.  Dispense: 3 each; Refill: 3  -     albuterol sulfate  (90 Base) MCG/ACT inhaler; Inhale 2 puffs Every 4 (Four) Hours As Needed for Wheezing.  Dispense: 18 g; Refill: 2    2. Encounter for screening mammogram for malignant neoplasm of breast  -     Mammo Screening Digital Tomosynthesis Bilateral With CAD; Future    3. Personal history of nicotine dependence  -     CT Chest Low Dose Wo; Future    4. C. difficile diarrhea      Assessment & Plan  1. Clostridium difficile colitis.  Her condition has shown significant improvement, with most bowel movements now solid and formed. She occasionally experiences thin, paper-like stools, which is expected as the colon lining heals. There is no blood in the stool, cramping, or belly pain. She has gained weight, indicating recovery. She was advised to continue taking Florastor daily for a couple of months.  She was informed that there is no follow-up stool test for this condition.  Resolution is defined by cessation of symptoms.  She was also advised to maintain good hand hygiene with soap and water, as alcohol-based sanitizers may not kill C. difficile.    2. COPD  Her respiratory function is stable. A prescription for Trelegy, to be taken once daily, was provided, along with three refills. Additionally, a prescription for albuterol was issued for use as needed.    3. Health maintenance.  Orders for a mammogram and a CT scan of the chest were placed, to be conducted at [facility] around 02/14/2025. No blood work is required at this time; it will be done in the summertime.    Follow-up  The patient will follow up in 6 months, or earlier if necessary.        Call with any problems or concerns before next visit       Return in about 6 months (around 8/4/2025).  Patient or patient representative verbalized consent for the use of Ambient Listening during the visit with  Rosalind Dutton MD for chart documentation. 2/4/2025  15:59 EST    Part of this note may be an electronic transcription/translation of spoken language to printed text using the Dragon Dictation System    Rosalind Dutton MD2/4/202515:59 EST  This note has been electronically signed